# Patient Record
Sex: MALE | NOT HISPANIC OR LATINO | Employment: OTHER | URBAN - METROPOLITAN AREA
[De-identification: names, ages, dates, MRNs, and addresses within clinical notes are randomized per-mention and may not be internally consistent; named-entity substitution may affect disease eponyms.]

---

## 2017-09-05 ENCOUNTER — RECORDS - HEALTHEAST (OUTPATIENT)
Dept: LAB | Facility: CLINIC | Age: 82
End: 2017-09-05

## 2017-09-05 LAB
CHOLEST SERPL-MCNC: 139 MG/DL
FASTING STATUS PATIENT QL REPORTED: NORMAL
HDLC SERPL-MCNC: 61 MG/DL
LDLC SERPL CALC-MCNC: 65 MG/DL
TRIGL SERPL-MCNC: 63 MG/DL

## 2018-08-21 ENCOUNTER — RECORDS - HEALTHEAST (OUTPATIENT)
Dept: LAB | Facility: CLINIC | Age: 83
End: 2018-08-21

## 2018-08-21 LAB
ALBUMIN SERPL-MCNC: 3.5 G/DL (ref 3.5–5)
ANION GAP SERPL CALCULATED.3IONS-SCNC: 8 MMOL/L (ref 5–18)
BUN SERPL-MCNC: 20 MG/DL (ref 8–28)
CALCIUM SERPL-MCNC: 9.2 MG/DL (ref 8.5–10.5)
CHLORIDE BLD-SCNC: 106 MMOL/L (ref 98–107)
CO2 SERPL-SCNC: 27 MMOL/L (ref 22–31)
CREAT SERPL-MCNC: 1.25 MG/DL (ref 0.7–1.3)
GFR SERPL CREATININE-BSD FRML MDRD: 55 ML/MIN/1.73M2
GLUCOSE BLD-MCNC: 98 MG/DL (ref 70–125)
PHOSPHATE SERPL-MCNC: 3.3 MG/DL (ref 2.5–4.5)
POTASSIUM BLD-SCNC: 4.6 MMOL/L (ref 3.5–5)
SODIUM SERPL-SCNC: 141 MMOL/L (ref 136–145)

## 2018-09-06 ENCOUNTER — RECORDS - HEALTHEAST (OUTPATIENT)
Dept: LAB | Facility: CLINIC | Age: 83
End: 2018-09-06

## 2018-09-06 LAB
ALBUMIN SERPL-MCNC: 3.4 G/DL (ref 3.5–5)
ANION GAP SERPL CALCULATED.3IONS-SCNC: 8 MMOL/L (ref 5–18)
BUN SERPL-MCNC: 19 MG/DL (ref 8–28)
CALCIUM SERPL-MCNC: 8.9 MG/DL (ref 8.5–10.5)
CHLORIDE BLD-SCNC: 105 MMOL/L (ref 98–107)
CO2 SERPL-SCNC: 27 MMOL/L (ref 22–31)
CREAT SERPL-MCNC: 1.15 MG/DL (ref 0.7–1.3)
GFR SERPL CREATININE-BSD FRML MDRD: 60 ML/MIN/1.73M2
GLUCOSE BLD-MCNC: 131 MG/DL (ref 70–125)
PHOSPHATE SERPL-MCNC: 3.1 MG/DL (ref 2.5–4.5)
POTASSIUM BLD-SCNC: 4.5 MMOL/L (ref 3.5–5)
SODIUM SERPL-SCNC: 140 MMOL/L (ref 136–145)

## 2018-10-08 ENCOUNTER — RECORDS - HEALTHEAST (OUTPATIENT)
Dept: LAB | Facility: CLINIC | Age: 83
End: 2018-10-08

## 2018-10-08 LAB
ALBUMIN SERPL-MCNC: 3.4 G/DL (ref 3.5–5)
ANION GAP SERPL CALCULATED.3IONS-SCNC: 7 MMOL/L (ref 5–18)
BUN SERPL-MCNC: 22 MG/DL (ref 8–28)
CALCIUM SERPL-MCNC: 9.4 MG/DL (ref 8.5–10.5)
CHLORIDE BLD-SCNC: 100 MMOL/L (ref 98–107)
CO2 SERPL-SCNC: 31 MMOL/L (ref 22–31)
CREAT SERPL-MCNC: 1.12 MG/DL (ref 0.7–1.3)
GFR SERPL CREATININE-BSD FRML MDRD: >60 ML/MIN/1.73M2
GLUCOSE BLD-MCNC: 118 MG/DL (ref 70–125)
PHOSPHATE SERPL-MCNC: 2.8 MG/DL (ref 2.5–4.5)
POTASSIUM BLD-SCNC: 4.3 MMOL/L (ref 3.5–5)
SODIUM SERPL-SCNC: 138 MMOL/L (ref 136–145)

## 2019-02-05 ENCOUNTER — RECORDS - HEALTHEAST (OUTPATIENT)
Dept: LAB | Facility: CLINIC | Age: 84
End: 2019-02-05

## 2019-02-05 LAB
ALBUMIN SERPL-MCNC: 3.6 G/DL (ref 3.5–5)
ANION GAP SERPL CALCULATED.3IONS-SCNC: 9 MMOL/L (ref 5–18)
BUN SERPL-MCNC: 20 MG/DL (ref 8–28)
CALCIUM SERPL-MCNC: 9.2 MG/DL (ref 8.5–10.5)
CHLORIDE BLD-SCNC: 100 MMOL/L (ref 98–107)
CO2 SERPL-SCNC: 29 MMOL/L (ref 22–31)
CREAT SERPL-MCNC: 1.16 MG/DL (ref 0.7–1.3)
GFR SERPL CREATININE-BSD FRML MDRD: 60 ML/MIN/1.73M2
GLUCOSE BLD-MCNC: 82 MG/DL (ref 70–125)
PHOSPHATE SERPL-MCNC: 3 MG/DL (ref 2.5–4.5)
POTASSIUM BLD-SCNC: 4.3 MMOL/L (ref 3.5–5)
SODIUM SERPL-SCNC: 138 MMOL/L (ref 136–145)

## 2020-02-17 ENCOUNTER — RECORDS - HEALTHEAST (OUTPATIENT)
Dept: LAB | Facility: CLINIC | Age: 85
End: 2020-02-17

## 2020-02-17 LAB — ERYTHROCYTE [SEDIMENTATION RATE] IN BLOOD BY WESTERGREN METHOD: 44 MM/HR (ref 0–15)

## 2020-02-18 LAB — B BURGDOR IGG+IGM SER QL: 0.13 INDEX VALUE

## 2020-03-05 ENCOUNTER — RECORDS - HEALTHEAST (OUTPATIENT)
Dept: LAB | Facility: CLINIC | Age: 85
End: 2020-03-05

## 2020-03-05 LAB
ALBUMIN SERPL-MCNC: 3.2 G/DL (ref 3.5–5)
ALP SERPL-CCNC: 70 U/L (ref 45–120)
ALT SERPL W P-5'-P-CCNC: 13 U/L (ref 0–45)
ANION GAP SERPL CALCULATED.3IONS-SCNC: 10 MMOL/L (ref 5–18)
AST SERPL W P-5'-P-CCNC: 14 U/L (ref 0–40)
BILIRUB SERPL-MCNC: 1.5 MG/DL (ref 0–1)
BUN SERPL-MCNC: 26 MG/DL (ref 8–28)
CALCIUM SERPL-MCNC: 8.8 MG/DL (ref 8.5–10.5)
CHLORIDE BLD-SCNC: 100 MMOL/L (ref 98–107)
CHOLEST SERPL-MCNC: 163 MG/DL
CO2 SERPL-SCNC: 28 MMOL/L (ref 22–31)
CREAT SERPL-MCNC: 1.13 MG/DL (ref 0.7–1.3)
ERYTHROCYTE [SEDIMENTATION RATE] IN BLOOD BY WESTERGREN METHOD: 9 MM/HR (ref 0–15)
FASTING STATUS PATIENT QL REPORTED: NORMAL
GFR SERPL CREATININE-BSD FRML MDRD: >60 ML/MIN/1.73M2
GLUCOSE BLD-MCNC: 99 MG/DL (ref 70–125)
HDLC SERPL-MCNC: 99 MG/DL
LDLC SERPL CALC-MCNC: 52 MG/DL
POTASSIUM BLD-SCNC: 3.8 MMOL/L (ref 3.5–5)
PROT SERPL-MCNC: 6.6 G/DL (ref 6–8)
SODIUM SERPL-SCNC: 138 MMOL/L (ref 136–145)
TRIGL SERPL-MCNC: 61 MG/DL

## 2020-05-04 ENCOUNTER — RECORDS - HEALTHEAST (OUTPATIENT)
Dept: LAB | Facility: CLINIC | Age: 85
End: 2020-05-04

## 2020-05-04 LAB — ERYTHROCYTE [SEDIMENTATION RATE] IN BLOOD BY WESTERGREN METHOD: 6 MM/HR (ref 0–15)

## 2020-06-04 ENCOUNTER — RECORDS - HEALTHEAST (OUTPATIENT)
Dept: LAB | Facility: CLINIC | Age: 85
End: 2020-06-04

## 2020-06-04 LAB — ERYTHROCYTE [SEDIMENTATION RATE] IN BLOOD BY WESTERGREN METHOD: 8 MM/HR (ref 0–15)

## 2020-06-24 ENCOUNTER — RECORDS - HEALTHEAST (OUTPATIENT)
Dept: LAB | Facility: CLINIC | Age: 85
End: 2020-06-24

## 2020-06-24 LAB
APPEARANCE FLD: ABNORMAL
COLOR FLD: YELLOW
CRYSTALS SNV MICRO: ABNORMAL
LYMPHOCYTES NFR FLD MANUAL: 12 %
MACROPHAGE % - HISTORICAL: 1 %
MONOCYTE % - HISTORICAL: 19 %
NEUTS BAND NFR FLD MANUAL: 69 %
RBC FLUID - HISTORICAL: ABNORMAL /UL
WBC # FLD AUTO: ABNORMAL /UL (ref 0–99)

## 2020-06-27 LAB
BACTERIA SPEC CULT: NO GROWTH
GRAM STAIN RESULT: NORMAL
GRAM STAIN RESULT: NORMAL

## 2020-07-02 ENCOUNTER — RECORDS - HEALTHEAST (OUTPATIENT)
Dept: LAB | Facility: CLINIC | Age: 85
End: 2020-07-02

## 2020-07-02 LAB — ERYTHROCYTE [SEDIMENTATION RATE] IN BLOOD BY WESTERGREN METHOD: 5 MM/HR (ref 0–15)

## 2020-08-10 ENCOUNTER — RECORDS - HEALTHEAST (OUTPATIENT)
Dept: LAB | Facility: CLINIC | Age: 85
End: 2020-08-10

## 2020-08-10 LAB — ERYTHROCYTE [SEDIMENTATION RATE] IN BLOOD BY WESTERGREN METHOD: 28 MM/HR (ref 0–15)

## 2020-09-08 ENCOUNTER — RECORDS - HEALTHEAST (OUTPATIENT)
Dept: LAB | Facility: CLINIC | Age: 85
End: 2020-09-08

## 2020-09-08 LAB — ERYTHROCYTE [SEDIMENTATION RATE] IN BLOOD BY WESTERGREN METHOD: 10 MM/HR (ref 0–15)

## 2020-10-09 ENCOUNTER — RECORDS - HEALTHEAST (OUTPATIENT)
Dept: LAB | Facility: CLINIC | Age: 85
End: 2020-10-09

## 2020-10-09 LAB — ERYTHROCYTE [SEDIMENTATION RATE] IN BLOOD BY WESTERGREN METHOD: 14 MM/HR (ref 0–15)

## 2020-10-27 ENCOUNTER — RECORDS - HEALTHEAST (OUTPATIENT)
Dept: LAB | Facility: CLINIC | Age: 85
End: 2020-10-27

## 2020-10-27 LAB
ALBUMIN SERPL-MCNC: 3.3 G/DL (ref 3.5–5)
ALP SERPL-CCNC: 91 U/L (ref 45–120)
ALT SERPL W P-5'-P-CCNC: 10 U/L (ref 0–45)
ANION GAP SERPL CALCULATED.3IONS-SCNC: 12 MMOL/L (ref 5–18)
AST SERPL W P-5'-P-CCNC: 15 U/L (ref 0–40)
BILIRUB SERPL-MCNC: 1.6 MG/DL (ref 0–1)
BUN SERPL-MCNC: 15 MG/DL (ref 8–28)
CALCIUM SERPL-MCNC: 9.1 MG/DL (ref 8.5–10.5)
CHLORIDE BLD-SCNC: 98 MMOL/L (ref 98–107)
CO2 SERPL-SCNC: 28 MMOL/L (ref 22–31)
CREAT SERPL-MCNC: 1.14 MG/DL (ref 0.7–1.3)
ERYTHROCYTE [SEDIMENTATION RATE] IN BLOOD BY WESTERGREN METHOD: 56 MM/HR (ref 0–15)
GFR SERPL CREATININE-BSD FRML MDRD: >60 ML/MIN/1.73M2
GLUCOSE BLD-MCNC: 109 MG/DL (ref 70–125)
POTASSIUM BLD-SCNC: 3.9 MMOL/L (ref 3.5–5)
PROT SERPL-MCNC: 7.5 G/DL (ref 6–8)
SODIUM SERPL-SCNC: 138 MMOL/L (ref 136–145)

## 2020-11-23 ENCOUNTER — RECORDS - HEALTHEAST (OUTPATIENT)
Dept: LAB | Facility: CLINIC | Age: 85
End: 2020-11-23

## 2020-11-23 LAB — ERYTHROCYTE [SEDIMENTATION RATE] IN BLOOD BY WESTERGREN METHOD: 9 MM/HR (ref 0–15)

## 2020-12-21 ENCOUNTER — RECORDS - HEALTHEAST (OUTPATIENT)
Dept: LAB | Facility: CLINIC | Age: 85
End: 2020-12-21

## 2020-12-21 LAB — ERYTHROCYTE [SEDIMENTATION RATE] IN BLOOD BY WESTERGREN METHOD: 9 MM/HR (ref 0–15)

## 2021-02-01 ENCOUNTER — RECORDS - HEALTHEAST (OUTPATIENT)
Dept: LAB | Facility: CLINIC | Age: 86
End: 2021-02-01

## 2021-02-01 LAB — ERYTHROCYTE [SEDIMENTATION RATE] IN BLOOD BY WESTERGREN METHOD: 13 MM/HR (ref 0–15)

## 2021-03-15 ENCOUNTER — RECORDS - HEALTHEAST (OUTPATIENT)
Dept: LAB | Facility: CLINIC | Age: 86
End: 2021-03-15

## 2021-03-15 LAB
CHOLEST SERPL-MCNC: 133 MG/DL
ERYTHROCYTE [SEDIMENTATION RATE] IN BLOOD BY WESTERGREN METHOD: 19 MM/HR (ref 0–15)
FASTING STATUS PATIENT QL REPORTED: NORMAL
HDLC SERPL-MCNC: 73 MG/DL
LDLC SERPL CALC-MCNC: 47 MG/DL
TRIGL SERPL-MCNC: 65 MG/DL

## 2021-04-19 ENCOUNTER — RECORDS - HEALTHEAST (OUTPATIENT)
Dept: LAB | Facility: CLINIC | Age: 86
End: 2021-04-19

## 2021-04-19 LAB — ERYTHROCYTE [SEDIMENTATION RATE] IN BLOOD BY WESTERGREN METHOD: 13 MM/HR (ref 0–15)

## 2021-05-17 ENCOUNTER — RECORDS - HEALTHEAST (OUTPATIENT)
Dept: LAB | Facility: CLINIC | Age: 86
End: 2021-05-17

## 2021-05-17 LAB — ERYTHROCYTE [SEDIMENTATION RATE] IN BLOOD BY WESTERGREN METHOD: 18 MM/HR (ref 0–15)

## 2021-05-26 ENCOUNTER — RECORDS - HEALTHEAST (OUTPATIENT)
Dept: ADMINISTRATIVE | Facility: CLINIC | Age: 86
End: 2021-05-26

## 2021-06-28 ENCOUNTER — RECORDS - HEALTHEAST (OUTPATIENT)
Dept: LAB | Facility: CLINIC | Age: 86
End: 2021-06-28

## 2021-06-28 LAB — ERYTHROCYTE [SEDIMENTATION RATE] IN BLOOD BY WESTERGREN METHOD: 18 MM/HR (ref 0–15)

## 2021-08-09 ENCOUNTER — LAB REQUISITION (OUTPATIENT)
Dept: LAB | Facility: CLINIC | Age: 86
End: 2021-08-09
Payer: MEDICARE

## 2021-08-09 DIAGNOSIS — M35.3 POLYMYALGIA RHEUMATICA (H): ICD-10-CM

## 2021-08-09 LAB — ERYTHROCYTE [SEDIMENTATION RATE] IN BLOOD BY WESTERGREN METHOD: 21 MM/HR (ref 0–15)

## 2021-08-09 PROCEDURE — 85652 RBC SED RATE AUTOMATED: CPT | Mod: ORL | Performed by: FAMILY MEDICINE

## 2021-08-21 ENCOUNTER — HEALTH MAINTENANCE LETTER (OUTPATIENT)
Age: 86
End: 2021-08-21

## 2021-09-20 ENCOUNTER — LAB REQUISITION (OUTPATIENT)
Dept: LAB | Facility: CLINIC | Age: 86
End: 2021-09-20
Payer: MEDICARE

## 2021-09-20 DIAGNOSIS — M35.3 POLYMYALGIA RHEUMATICA (H): ICD-10-CM

## 2021-09-20 LAB
ALBUMIN SERPL-MCNC: 3.6 G/DL (ref 3.5–5)
ALP SERPL-CCNC: 94 U/L (ref 45–120)
ALT SERPL W P-5'-P-CCNC: 10 U/L (ref 0–45)
ANION GAP SERPL CALCULATED.3IONS-SCNC: 12 MMOL/L (ref 5–18)
AST SERPL W P-5'-P-CCNC: 18 U/L (ref 0–40)
BASOPHILS # BLD AUTO: 0.1 10E3/UL (ref 0–0.2)
BASOPHILS NFR BLD AUTO: 1 %
BILIRUB SERPL-MCNC: 1.3 MG/DL (ref 0–1)
BUN SERPL-MCNC: 22 MG/DL (ref 8–28)
CALCIUM SERPL-MCNC: 9.1 MG/DL (ref 8.5–10.5)
CHLORIDE BLD-SCNC: 101 MMOL/L (ref 98–107)
CO2 SERPL-SCNC: 29 MMOL/L (ref 22–31)
CREAT SERPL-MCNC: 1.41 MG/DL (ref 0.7–1.3)
EOSINOPHIL # BLD AUTO: 0.2 10E3/UL (ref 0–0.7)
EOSINOPHIL NFR BLD AUTO: 2 %
ERYTHROCYTE [DISTWIDTH] IN BLOOD BY AUTOMATED COUNT: 13.3 % (ref 10–15)
ERYTHROCYTE [SEDIMENTATION RATE] IN BLOOD BY WESTERGREN METHOD: 19 MM/HR (ref 0–15)
GFR SERPL CREATININE-BSD FRML MDRD: 44 ML/MIN/1.73M2
GLUCOSE BLD-MCNC: 83 MG/DL (ref 70–125)
HCT VFR BLD AUTO: 45.3 % (ref 40–53)
HGB BLD-MCNC: 15.1 G/DL (ref 13.3–17.7)
IMM GRANULOCYTES # BLD: 0 10E3/UL
IMM GRANULOCYTES NFR BLD: 0 %
LYMPHOCYTES # BLD AUTO: 3.2 10E3/UL (ref 0.8–5.3)
LYMPHOCYTES NFR BLD AUTO: 36 %
MCH RBC QN AUTO: 31.4 PG (ref 26.5–33)
MCHC RBC AUTO-ENTMCNC: 33.3 G/DL (ref 31.5–36.5)
MCV RBC AUTO: 94 FL (ref 78–100)
MONOCYTES # BLD AUTO: 1.3 10E3/UL (ref 0–1.3)
MONOCYTES NFR BLD AUTO: 15 %
NEUTROPHILS # BLD AUTO: 4.2 10E3/UL (ref 1.6–8.3)
NEUTROPHILS NFR BLD AUTO: 46 %
NRBC # BLD AUTO: 0 10E3/UL
NRBC BLD AUTO-RTO: 0 /100
PLATELET # BLD AUTO: 197 10E3/UL (ref 150–450)
POTASSIUM BLD-SCNC: 4 MMOL/L (ref 3.5–5)
PROT SERPL-MCNC: 7.5 G/DL (ref 6–8)
RBC # BLD AUTO: 4.81 10E6/UL (ref 4.4–5.9)
SODIUM SERPL-SCNC: 142 MMOL/L (ref 136–145)
WBC # BLD AUTO: 9 10E3/UL (ref 4–11)

## 2021-09-20 PROCEDURE — 85652 RBC SED RATE AUTOMATED: CPT | Mod: ORL | Performed by: FAMILY MEDICINE

## 2021-09-20 PROCEDURE — 80053 COMPREHEN METABOLIC PANEL: CPT | Mod: ORL | Performed by: FAMILY MEDICINE

## 2021-09-20 PROCEDURE — 36415 COLL VENOUS BLD VENIPUNCTURE: CPT | Mod: ORL | Performed by: FAMILY MEDICINE

## 2021-09-20 PROCEDURE — 85025 COMPLETE CBC W/AUTO DIFF WBC: CPT | Mod: ORL | Performed by: FAMILY MEDICINE

## 2021-10-16 ENCOUNTER — HEALTH MAINTENANCE LETTER (OUTPATIENT)
Age: 86
End: 2021-10-16

## 2021-11-22 ENCOUNTER — LAB REQUISITION (OUTPATIENT)
Dept: LAB | Facility: CLINIC | Age: 86
End: 2021-11-22
Payer: MEDICARE

## 2021-11-22 DIAGNOSIS — M35.3 POLYMYALGIA RHEUMATICA (H): ICD-10-CM

## 2021-11-22 LAB — ERYTHROCYTE [SEDIMENTATION RATE] IN BLOOD BY WESTERGREN METHOD: 24 MM/HR (ref 0–15)

## 2021-11-22 PROCEDURE — 85652 RBC SED RATE AUTOMATED: CPT | Mod: ORL | Performed by: FAMILY MEDICINE

## 2021-12-20 ENCOUNTER — LAB REQUISITION (OUTPATIENT)
Dept: LAB | Facility: CLINIC | Age: 86
End: 2021-12-20
Payer: MEDICARE

## 2021-12-20 DIAGNOSIS — M35.3 POLYMYALGIA RHEUMATICA (H): ICD-10-CM

## 2021-12-20 LAB — ERYTHROCYTE [SEDIMENTATION RATE] IN BLOOD BY WESTERGREN METHOD: 15 MM/HR (ref 0–15)

## 2021-12-20 PROCEDURE — 85652 RBC SED RATE AUTOMATED: CPT | Mod: ORL | Performed by: FAMILY MEDICINE

## 2022-02-01 ENCOUNTER — LAB REQUISITION (OUTPATIENT)
Dept: LAB | Facility: CLINIC | Age: 87
End: 2022-02-01
Payer: MEDICARE

## 2022-02-01 DIAGNOSIS — M35.3 POLYMYALGIA RHEUMATICA (H): ICD-10-CM

## 2022-02-01 LAB — ERYTHROCYTE [SEDIMENTATION RATE] IN BLOOD BY WESTERGREN METHOD: 39 MM/HR (ref 0–15)

## 2022-02-01 PROCEDURE — 85652 RBC SED RATE AUTOMATED: CPT | Mod: ORL | Performed by: FAMILY MEDICINE

## 2022-03-02 ENCOUNTER — LAB REQUISITION (OUTPATIENT)
Dept: LAB | Facility: CLINIC | Age: 87
End: 2022-03-02
Payer: MEDICARE

## 2022-03-02 DIAGNOSIS — M35.3 POLYMYALGIA RHEUMATICA (H): ICD-10-CM

## 2022-03-02 LAB — ERYTHROCYTE [SEDIMENTATION RATE] IN BLOOD BY WESTERGREN METHOD: 20 MM/HR (ref 0–15)

## 2022-03-02 PROCEDURE — 85652 RBC SED RATE AUTOMATED: CPT | Mod: ORL | Performed by: FAMILY MEDICINE

## 2022-04-04 ENCOUNTER — LAB REQUISITION (OUTPATIENT)
Dept: LAB | Facility: CLINIC | Age: 87
End: 2022-04-04
Payer: MEDICARE

## 2022-04-04 ENCOUNTER — TRANSFERRED RECORDS (OUTPATIENT)
Dept: HEALTH INFORMATION MANAGEMENT | Facility: CLINIC | Age: 87
End: 2022-04-04

## 2022-04-04 DIAGNOSIS — M35.3 POLYMYALGIA RHEUMATICA (H): ICD-10-CM

## 2022-04-04 LAB — ERYTHROCYTE [SEDIMENTATION RATE] IN BLOOD BY WESTERGREN METHOD: 18 MM/HR (ref 0–15)

## 2022-04-04 PROCEDURE — 85652 RBC SED RATE AUTOMATED: CPT | Mod: ORL | Performed by: FAMILY MEDICINE

## 2022-06-06 ENCOUNTER — LAB REQUISITION (OUTPATIENT)
Dept: LAB | Facility: CLINIC | Age: 87
End: 2022-06-06
Payer: MEDICARE

## 2022-06-06 DIAGNOSIS — M35.3 POLYMYALGIA RHEUMATICA (H): ICD-10-CM

## 2022-06-06 LAB — ERYTHROCYTE [SEDIMENTATION RATE] IN BLOOD BY WESTERGREN METHOD: 83 MM/HR (ref 0–15)

## 2022-06-06 PROCEDURE — 85652 RBC SED RATE AUTOMATED: CPT | Performed by: FAMILY MEDICINE

## 2022-06-06 PROCEDURE — 85652 RBC SED RATE AUTOMATED: CPT | Mod: ORL | Performed by: FAMILY MEDICINE

## 2022-07-25 ENCOUNTER — LAB REQUISITION (OUTPATIENT)
Dept: LAB | Facility: CLINIC | Age: 87
End: 2022-07-25
Payer: MEDICARE

## 2022-07-25 DIAGNOSIS — I10 ESSENTIAL (PRIMARY) HYPERTENSION: ICD-10-CM

## 2022-07-25 DIAGNOSIS — M35.3 POLYMYALGIA RHEUMATICA (H): ICD-10-CM

## 2022-07-25 LAB
ALBUMIN SERPL BCG-MCNC: 3.6 G/DL (ref 3.5–5.2)
ANION GAP SERPL CALCULATED.3IONS-SCNC: 13 MMOL/L (ref 7–15)
BUN SERPL-MCNC: 20.1 MG/DL (ref 8–23)
CALCIUM SERPL-MCNC: 8.7 MG/DL (ref 8.2–9.6)
CHLORIDE SERPL-SCNC: 97 MMOL/L (ref 98–107)
CREAT SERPL-MCNC: 1.18 MG/DL (ref 0.67–1.17)
DEPRECATED HCO3 PLAS-SCNC: 27 MMOL/L (ref 22–29)
ERYTHROCYTE [SEDIMENTATION RATE] IN BLOOD BY WESTERGREN METHOD: 18 MM/HR (ref 0–20)
GFR SERPL CREATININE-BSD FRML MDRD: 59 ML/MIN/1.73M2
GLUCOSE SERPL-MCNC: 145 MG/DL (ref 70–99)
PHOSPHATE SERPL-MCNC: 2.6 MG/DL (ref 2.5–4.5)
POTASSIUM SERPL-SCNC: 3.9 MMOL/L (ref 3.4–5.3)
SODIUM SERPL-SCNC: 137 MMOL/L (ref 136–145)

## 2022-07-25 PROCEDURE — 85652 RBC SED RATE AUTOMATED: CPT | Mod: ORL | Performed by: FAMILY MEDICINE

## 2022-07-25 PROCEDURE — 80051 ELECTROLYTE PANEL: CPT | Mod: ORL | Performed by: FAMILY MEDICINE

## 2022-08-04 ENCOUNTER — HOSPITAL ENCOUNTER (INPATIENT)
Facility: HOSPITAL | Age: 87
LOS: 1 days | Discharge: HOME | DRG: 069 | End: 2022-08-08
Attending: EMERGENCY MEDICINE | Admitting: INTERNAL MEDICINE
Payer: MEDICARE

## 2022-08-04 ENCOUNTER — APPOINTMENT (OUTPATIENT)
Dept: CT IMAGING | Facility: HOSPITAL | Age: 87
DRG: 069 | End: 2022-08-04
Attending: EMERGENCY MEDICINE
Payer: MEDICARE

## 2022-08-04 DIAGNOSIS — G45.9 TIA (TRANSIENT ISCHEMIC ATTACK): ICD-10-CM

## 2022-08-04 DIAGNOSIS — U07.1 INFECTION DUE TO 2019 NOVEL CORONAVIRUS: ICD-10-CM

## 2022-08-04 DIAGNOSIS — I50.31 ACUTE DIASTOLIC CONGESTIVE HEART FAILURE (H): Primary | ICD-10-CM

## 2022-08-04 LAB
ANION GAP SERPL CALCULATED.3IONS-SCNC: 8 MMOL/L (ref 5–18)
APTT PPP: 29 SECONDS (ref 22–38)
BASOPHILS # BLD MANUAL: 0.2 10E3/UL (ref 0–0.2)
BASOPHILS NFR BLD MANUAL: 2 %
BUN SERPL-MCNC: 20 MG/DL (ref 8–28)
CALCIUM SERPL-MCNC: 9.2 MG/DL (ref 8.5–10.5)
CHLORIDE BLD-SCNC: 95 MMOL/L (ref 98–107)
CHOLEST SERPL-MCNC: 150 MG/DL
CO2 SERPL-SCNC: 30 MMOL/L (ref 22–31)
CREAT SERPL-MCNC: 1.33 MG/DL (ref 0.7–1.3)
EOSINOPHIL # BLD MANUAL: 0 10E3/UL (ref 0–0.7)
EOSINOPHIL NFR BLD MANUAL: 0 %
ERYTHROCYTE [DISTWIDTH] IN BLOOD BY AUTOMATED COUNT: 13.7 % (ref 10–15)
GFR SERPL CREATININE-BSD FRML MDRD: 51 ML/MIN/1.73M2
GLUCOSE BLD-MCNC: 92 MG/DL (ref 70–125)
GLUCOSE BLDC GLUCOMTR-MCNC: 96 MG/DL (ref 70–99)
HBA1C MFR BLD: 5.6 %
HCT VFR BLD AUTO: 46.7 % (ref 40–53)
HDLC SERPL-MCNC: 62 MG/DL
HGB BLD-MCNC: 15.7 G/DL (ref 13.3–17.7)
HOLD SPECIMEN: NORMAL
INR PPP: 0.95 (ref 0.85–1.15)
LDLC SERPL CALC-MCNC: 69 MG/DL
LYMPHOCYTES # BLD MANUAL: 3.3 10E3/UL (ref 0.8–5.3)
LYMPHOCYTES NFR BLD MANUAL: 44 %
MCH RBC QN AUTO: 30.7 PG (ref 26.5–33)
MCHC RBC AUTO-ENTMCNC: 33.6 G/DL (ref 31.5–36.5)
MCV RBC AUTO: 91 FL (ref 78–100)
MONOCYTES # BLD MANUAL: 0.8 10E3/UL (ref 0–1.3)
MONOCYTES NFR BLD MANUAL: 10 %
NEUTROPHILS # BLD MANUAL: 3.3 10E3/UL (ref 1.6–8.3)
NEUTROPHILS NFR BLD MANUAL: 44 %
PLAT MORPH BLD: ABNORMAL
PLATELET # BLD AUTO: 228 10E3/UL (ref 150–450)
POTASSIUM BLD-SCNC: 3.6 MMOL/L (ref 3.5–5)
RADIOLOGIST FLAGS: ABNORMAL
RBC # BLD AUTO: 5.11 10E6/UL (ref 4.4–5.9)
RBC MORPH BLD: ABNORMAL
SARS-COV-2 RNA RESP QL NAA+PROBE: POSITIVE
SODIUM SERPL-SCNC: 133 MMOL/L (ref 136–145)
TRIGL SERPL-MCNC: 96 MG/DL
TROPONIN I SERPL-MCNC: 0.01 NG/ML (ref 0–0.29)
VARIANT LYMPHS BLD QL SMEAR: PRESENT
WBC # BLD AUTO: 7.6 10E3/UL (ref 4–11)

## 2022-08-04 PROCEDURE — G0378 HOSPITAL OBSERVATION PER HR: HCPCS

## 2022-08-04 PROCEDURE — 85610 PROTHROMBIN TIME: CPT | Performed by: EMERGENCY MEDICINE

## 2022-08-04 PROCEDURE — 70498 CT ANGIOGRAPHY NECK: CPT | Mod: MG

## 2022-08-04 PROCEDURE — 99285 EMERGENCY DEPT VISIT HI MDM: CPT | Mod: 25

## 2022-08-04 PROCEDURE — 85041 AUTOMATED RBC COUNT: CPT | Performed by: EMERGENCY MEDICINE

## 2022-08-04 PROCEDURE — 84484 ASSAY OF TROPONIN QUANT: CPT | Performed by: EMERGENCY MEDICINE

## 2022-08-04 PROCEDURE — 85007 BL SMEAR W/DIFF WBC COUNT: CPT | Performed by: EMERGENCY MEDICINE

## 2022-08-04 PROCEDURE — 85730 THROMBOPLASTIN TIME PARTIAL: CPT | Performed by: EMERGENCY MEDICINE

## 2022-08-04 PROCEDURE — 93005 ELECTROCARDIOGRAM TRACING: CPT | Performed by: EMERGENCY MEDICINE

## 2022-08-04 PROCEDURE — G1010 CDSM STANSON: HCPCS

## 2022-08-04 PROCEDURE — 83036 HEMOGLOBIN GLYCOSYLATED A1C: CPT | Performed by: INTERNAL MEDICINE

## 2022-08-04 PROCEDURE — 87635 SARS-COV-2 COVID-19 AMP PRB: CPT | Performed by: EMERGENCY MEDICINE

## 2022-08-04 PROCEDURE — 250N000011 HC RX IP 250 OP 636: Performed by: EMERGENCY MEDICINE

## 2022-08-04 PROCEDURE — 80061 LIPID PANEL: CPT | Performed by: INTERNAL MEDICINE

## 2022-08-04 PROCEDURE — 36415 COLL VENOUS BLD VENIPUNCTURE: CPT | Performed by: EMERGENCY MEDICINE

## 2022-08-04 PROCEDURE — 250N000013 HC RX MED GY IP 250 OP 250 PS 637: Performed by: EMERGENCY MEDICINE

## 2022-08-04 PROCEDURE — 99218 PR INITIAL OBSERVATION CARE,LEVEL I: CPT | Performed by: INTERNAL MEDICINE

## 2022-08-04 PROCEDURE — 80048 BASIC METABOLIC PNL TOTAL CA: CPT | Performed by: EMERGENCY MEDICINE

## 2022-08-04 PROCEDURE — 83735 ASSAY OF MAGNESIUM: CPT | Performed by: FAMILY MEDICINE

## 2022-08-04 PROCEDURE — 85018 HEMOGLOBIN: CPT | Performed by: EMERGENCY MEDICINE

## 2022-08-04 PROCEDURE — 84145 PROCALCITONIN (PCT): CPT | Performed by: FAMILY MEDICINE

## 2022-08-04 PROCEDURE — C9803 HOPD COVID-19 SPEC COLLECT: HCPCS

## 2022-08-04 RX ORDER — AMOXICILLIN 250 MG
2 CAPSULE ORAL 2 TIMES DAILY PRN
Status: DISCONTINUED | OUTPATIENT
Start: 2022-08-04 | End: 2022-08-08 | Stop reason: HOSPADM

## 2022-08-04 RX ORDER — HYDRALAZINE HYDROCHLORIDE 20 MG/ML
10 INJECTION INTRAMUSCULAR; INTRAVENOUS EVERY 6 HOURS PRN
Status: DISCONTINUED | OUTPATIENT
Start: 2022-08-04 | End: 2022-08-08 | Stop reason: HOSPADM

## 2022-08-04 RX ORDER — ANTIOX #8/OM3/DHA/EPA/LUT/ZEAX 250-2.5 MG
1 CAPSULE ORAL 2 TIMES DAILY
COMMUNITY

## 2022-08-04 RX ORDER — LOPERAMIDE HCL 2 MG
4 CAPSULE ORAL DAILY PRN
Status: DISCONTINUED | OUTPATIENT
Start: 2022-08-04 | End: 2022-08-08 | Stop reason: HOSPADM

## 2022-08-04 RX ORDER — ACETAMINOPHEN 325 MG/1
650 TABLET ORAL EVERY 6 HOURS PRN
Status: DISCONTINUED | OUTPATIENT
Start: 2022-08-04 | End: 2022-08-08 | Stop reason: HOSPADM

## 2022-08-04 RX ORDER — PREDNISONE 1 MG/1
4 TABLET ORAL DAILY
Status: DISCONTINUED | OUTPATIENT
Start: 2022-08-05 | End: 2022-08-08 | Stop reason: HOSPADM

## 2022-08-04 RX ORDER — LIDOCAINE 40 MG/G
CREAM TOPICAL
Status: DISCONTINUED | OUTPATIENT
Start: 2022-08-04 | End: 2022-08-05

## 2022-08-04 RX ORDER — SIMETHICONE 80 MG
80-160 TABLET,CHEWABLE ORAL 2 TIMES DAILY PRN
Status: DISCONTINUED | OUTPATIENT
Start: 2022-08-04 | End: 2022-08-08 | Stop reason: HOSPADM

## 2022-08-04 RX ORDER — PREDNISONE 1 MG/1
60 TABLET ORAL DAILY
COMMUNITY

## 2022-08-04 RX ORDER — ONDANSETRON 2 MG/ML
4 INJECTION INTRAMUSCULAR; INTRAVENOUS EVERY 6 HOURS PRN
Status: DISCONTINUED | OUTPATIENT
Start: 2022-08-04 | End: 2022-08-08 | Stop reason: HOSPADM

## 2022-08-04 RX ORDER — ATENOLOL 25 MG/1
50 TABLET ORAL DAILY
Status: DISCONTINUED | OUTPATIENT
Start: 2022-08-05 | End: 2022-08-08 | Stop reason: HOSPADM

## 2022-08-04 RX ORDER — SIMETHICONE 125 MG
1 CAPSULE ORAL 2 TIMES DAILY PRN
COMMUNITY

## 2022-08-04 RX ORDER — SIMVASTATIN 20 MG
20 TABLET ORAL AT BEDTIME
COMMUNITY

## 2022-08-04 RX ORDER — AMLODIPINE BESYLATE 5 MG/1
5 TABLET ORAL DAILY
Status: DISCONTINUED | OUTPATIENT
Start: 2022-08-05 | End: 2022-08-08 | Stop reason: HOSPADM

## 2022-08-04 RX ORDER — HYDROCHLOROTHIAZIDE 25 MG/1
25 TABLET ORAL DAILY
Status: ON HOLD | COMMUNITY
End: 2022-08-08

## 2022-08-04 RX ORDER — HYDROCHLOROTHIAZIDE 25 MG/1
25 TABLET ORAL DAILY
Status: DISCONTINUED | OUTPATIENT
Start: 2022-08-05 | End: 2022-08-06

## 2022-08-04 RX ORDER — ONDANSETRON 4 MG/1
4 TABLET, ORALLY DISINTEGRATING ORAL EVERY 6 HOURS PRN
Status: DISCONTINUED | OUTPATIENT
Start: 2022-08-04 | End: 2022-08-08 | Stop reason: HOSPADM

## 2022-08-04 RX ORDER — ENOXAPARIN SODIUM 100 MG/ML
40 INJECTION SUBCUTANEOUS EVERY 24 HOURS
Status: DISCONTINUED | OUTPATIENT
Start: 2022-08-04 | End: 2022-08-04

## 2022-08-04 RX ORDER — CLOPIDOGREL BISULFATE 75 MG/1
300 TABLET ORAL ONCE
Status: COMPLETED | OUTPATIENT
Start: 2022-08-04 | End: 2022-08-04

## 2022-08-04 RX ORDER — LIDOCAINE 40 MG/G
CREAM TOPICAL
Status: DISCONTINUED | OUTPATIENT
Start: 2022-08-04 | End: 2022-08-08 | Stop reason: HOSPADM

## 2022-08-04 RX ORDER — ACETAMINOPHEN 650 MG/1
650 SUPPOSITORY RECTAL EVERY 6 HOURS PRN
Status: DISCONTINUED | OUTPATIENT
Start: 2022-08-04 | End: 2022-08-08 | Stop reason: HOSPADM

## 2022-08-04 RX ORDER — ASPIRIN 325 MG
325 TABLET ORAL ONCE
Status: COMPLETED | OUTPATIENT
Start: 2022-08-04 | End: 2022-08-04

## 2022-08-04 RX ORDER — VIT C/E/ZN/COPPR/LUTEIN/ZEAXAN 60 MG-6 MG
1 CAPSULE ORAL 2 TIMES DAILY
Status: DISCONTINUED | OUTPATIENT
Start: 2022-08-04 | End: 2022-08-08 | Stop reason: HOSPADM

## 2022-08-04 RX ORDER — IOPAMIDOL 755 MG/ML
75 INJECTION, SOLUTION INTRAVASCULAR ONCE
Status: COMPLETED | OUTPATIENT
Start: 2022-08-04 | End: 2022-08-04

## 2022-08-04 RX ORDER — AMOXICILLIN 250 MG
1 CAPSULE ORAL 2 TIMES DAILY PRN
Status: DISCONTINUED | OUTPATIENT
Start: 2022-08-04 | End: 2022-08-08 | Stop reason: HOSPADM

## 2022-08-04 RX ORDER — SIMVASTATIN 10 MG
20 TABLET ORAL AT BEDTIME
Status: DISCONTINUED | OUTPATIENT
Start: 2022-08-04 | End: 2022-08-08 | Stop reason: HOSPADM

## 2022-08-04 RX ORDER — AMLODIPINE BESYLATE 5 MG/1
5 TABLET ORAL DAILY
COMMUNITY

## 2022-08-04 RX ADMIN — IOPAMIDOL 75 ML: 755 INJECTION, SOLUTION INTRAVENOUS at 17:39

## 2022-08-04 RX ADMIN — CLOPIDOGREL BISULFATE 300 MG: 75 TABLET ORAL at 18:16

## 2022-08-04 RX ADMIN — ASPIRIN 325 MG: 325 TABLET ORAL at 18:16

## 2022-08-04 ASSESSMENT — ENCOUNTER SYMPTOMS
COLOR CHANGE: 0
WEAKNESS: 0
ABDOMINAL PAIN: 0
NECK STIFFNESS: 0
SHORTNESS OF BREATH: 0
DIFFICULTY URINATING: 0
CONFUSION: 0
HEADACHES: 0
FEVER: 0
SPEECH DIFFICULTY: 1
EYE REDNESS: 0
ARTHRALGIAS: 0

## 2022-08-04 NOTE — CONSULTS
[facility]    Stroke Telephone Note    I was called by Purvi Jordan on 08/04/22 regarding patient Jeffy Olson. The patient is a 89 year old male with prior TIA who presents with acute onset of confusion, he ws trying to start the car and couldn't remember how to start it, he went back inside and had difficulty with expressive aphasia and couldn't recognize his granddaughter. His symptoms have resolved and has an NIHSS 0.     Stroke Code Data (for stroke code without tele)  Stroke code activated 08/04/22   1720   Stroke provider first response  08/04/22   1720            Last known normal 08/04/22   1545        Time of discovery   (or onset of symptoms) 08/04/22   1545   Head CT read by Stroke Neuro Dr/Provider 08/04/22   1728   Was stroke code de-escalated? 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     Intravenous Thrombolysis  Not given due to:   - minor/isolated/quickly resolving symptoms    Endovascular Treatment  Not initiated due to absence of proximal vessel occlusion    Impression  Transient ischemic attack- ABCD2 score 5, evidence of carotid bifurcation atherosclerosis without any significant stenosis.       Recommendations   - Neurochecks and Vital Signs every Q4H   - Daily aspirin 81 mg for secondary stroke prevention  - Plavix (clopidogrel) 300 mg PO loading dose x 1  - Plavix (clopidogrel) 75 mg PO Daily  - Statin: Lipitor 40mg  - MRI Brain with and without contrast  - TTE (with Bubble Study if age 60 yrs or less)  - 24-hour Telemetry  - Bedside Glucose Monitoring  - A1c, Lipid Panel  - PT/OT/SLP  - Stroke Education  - Euthermia, Euglycemia    My recommendations are based on the information provided over the phone by Jeffy Olson's in-person providers. They are not intended to replace the  "clinical judgment of his in-person providers. I was not requested to personally see or examine the patient at this time.    The Stroke Staff is Dr. Diaz.    Zandra Lozano MD  Vascular Neurology Fellow  To page me or covering stroke neurology team member, click here: [email or web address]   Choose \"On Call\" tab at top, then search dropdown box for \"Neurology Adult\", select location, press Enter, then look for stroke/neuro ICU/telestroke.        "

## 2022-08-04 NOTE — ED TRIAGE NOTES
Pt arrives with confusion that started 1.5 hours ago. Pt is just getting over COVID and has been boosted twice. Pt is alert. Pt states he was outside getting ready to start a car but forgot how to. He also was unable to identify his granddaughter      Triage Assessment     Row Name 08/04/22 6509       Triage Assessment (Adult)    Airway WDL WDL       Respiratory WDL    Respiratory WDL WDL       Skin Circulation/Temperature WDL    Skin Circulation/Temperature WDL WDL       Cardiac WDL    Cardiac WDL WDL       Peripheral/Neurovascular WDL    Peripheral Neurovascular WDL WDL       Cognitive/Neuro/Behavioral WDL    Cognitive/Neuro/Behavioral WDL X;orientation    Level of Consciousness confused    Orientation disoriented to;person;situation;time       Pupils (CN II)    Pupil PERRLA yes    Pupil Size Left 4 mm    Pupil Size Right 4 mm       Success Coma Scale    Best Eye Response 4-->(E4) spontaneous    Best Motor Response 6-->(M6) obeys commands    Best Verbal Response 4-->(V4) confused    Dominic Coma Scale Score 14

## 2022-08-04 NOTE — ED NOTES
"ED Provider In Triage Note  [facility]  Encounter Date: Aug 4, 2022    Chief Complaint   Patient presents with     Altered Mental Status     Tier One Stroke Code       Brief HPI:   Jeffy Olson is a 89 year old male presenting to the Emergency Department with a chief complaint of new onset speech difficulty/confusion that started around 1545.  Came inside from trying to start car, confused on words for car or how to start it and couldn't recall a close family member's name. This seems to have resolved.      Brief Physical Exam:  BP (!) 187/86   Pulse 70   Temp 99.6  F (37.6  C) (Temporal)   Resp 16   Ht 1.778 m (5' 10\")   Wt 74.8 kg (165 lb)   SpO2 90%   BMI 23.68 kg/m    General: Non-toxic appearing  HEENT: Atraumatic  Resp: No respiratory distress  Abdomen: Non-peritoneal  Neuro: Intact,  Alert, oriented, answers questions appropriately  Psych: Behavior appropriate      Plan Initiated in Triage:  Orders Placed This Encounter     CTA Head Neck with Contrast     Basic metabolic panel     INR     Partial thromboplastin time     Troponin I           PIT Dispo:   Tier 1 Stroke Code Eval    Omer Arguelles MD on 8/4/2022 at 5:11 PM    Patient was evaluated by the Physician in Triage due to a limitation of available rooms in the Emergency Department. A plan of care was discussed based on the information obtained on the initial evaluation and patient was consuled to return back to the Emergency Department lobby after this initial evalutaiton until results were obtained or a room became available in the Emergency Department. Patient was counseled not to leave prior to receiving the results of their workup.     Omer Arguelles MD  [facility] EMERGENCY DEPARTMENT  [address] [place] [id number]  [phone]       Omer Arguelles MD  08/04/22 1719    "

## 2022-08-04 NOTE — ED PROVIDER NOTES
EMERGENCY DEPARTMENT ENCOUNTER     NAME: Jeffy Olson   AGE: 89 year old male   YOB: 1931   MRN: [id number]   EVALUATION DATE & TIME: No admission date for patient encounter.   PCP: Zaki Ochoa     Chief Complaint   Patient presents with     Altered Mental Status     Tier One Stroke Code   :    FINAL IMPRESSION       1. TIA (transient ischemic attack)           ED COURSE & MEDICAL DECISION MAKING      Pertinent Labs & Imaging studies reviewed. (See chart for details)   89 year old male  presents to the Emergency Department for evaluation of an episode of acute aphasia and memory loss.  Patient went to get in his car and started, could not remember how to start his car or could not remember who his granddaughter was.  By the time of arrival here symptoms have resolved.  He does have a history of previous TIA. Initial Vitals Reviewed. Initial exam notable for well-appearing elderly male who currently has an NIH stroke scale of 0, no speech difficulty and no neurologic deficit.  I did consider a small stroke versus TIA, and a stroke code was called as he was within the window for intervention.  The CT and CTA are negative.  Neurology from the  with the stroke team evaluated the patient and they are recommending a full Plavix and aspirin load, admission for TIA work-up.  I discussed the case with hospitalist Dr. Jauregui who accepted the patient for admission.        5:15 PM I introduced myself to the patient, obtained patient history, performed a physical exam, and discussed plan for ED workup including potential diagnostic laboratory/imaging studies and interventions. Tier 1 stroke code called.  5:20 PM Spoke with the stroke neurologist, Dr. Lozano.  5:36 PM Spoke with the radiologist. Plain CT normal, awaiting CTA result.  5:48 PM Spoke with the stroke neurologist, Dr. Lozano, stroke code cancelled.  I spoke with the hospitalist, Dr. Jauregui. We discussed the patient's case, and  they agree to admit the patient.      At the conclusion of the encounter I discussed the results of all of the tests and the disposition. The questions were answered. The patient or family acknowledged understanding and was agreeable with the care plan.     35 minutes critical care time, see procedure note below for details if relevant    MEDICATIONS GIVEN IN THE EMERGENCY:   Medications   clopidogrel (PLAVIX) tablet 300 mg (has no administration in time range)   aspirin (ASA) tablet 325 mg (has no administration in time range)   iopamidol (ISOVUE-370) solution 75 mL (75 mLs Intravenous Given 8/4/22 8892)      NEW PRESCRIPTIONS STARTED AT TODAY'S ER VISIT   New Prescriptions    No medications on file     ================================================================   HISTORY OF PRESENT ILLNESS       Patient information was obtained from: Patient   Use of Intrepreter: N/A    Jeffy Olson is a 89 year old male with history of HTN, HLD, and TIA who presents to the ED for evaluation of aphasia. Apr 1.5 hpours prior to arrival the patient had an episode of aphasia in which he was unable to explain how to start a car, what he was doing with the car, and was unable to recognize a close family friend. He has a history of TIA. At the time of ED presentation all symptoms have resolved. He has not had any focal weakness.    The patient was diagnosed with COVID 2 weeks ago. He recently had some issues feeling short of breath and was evaluated at urgent care.    Per chart review, the patient was seen at [facility] on 8/3/22 presenting for evaluation of shortness of breath. The patient reported testing positive for COVID 2 weeks ago and has since had increased shortness of breath with exercise. On exam, patient had good breath sounds bilaterally. CBC unremarkable, BMP showed mild creatinine elevation which is at baseline, D-dimer and troponin negative. XR Chest showed normal heart size and pulmonary  vascularity, no new infiltrates or effusions. ECG showed sinus rhythm with 1st degree AV block and PVCs, RBBB. The patient was sent home.    ================================================================    REVIEW OF SYSTEMS       Review of Systems   Constitutional: Negative for fever.   HENT: Negative for congestion.    Eyes: Negative for redness.   Respiratory: Negative for shortness of breath.    Cardiovascular: Negative for chest pain.   Gastrointestinal: Negative for abdominal pain.   Genitourinary: Negative for difficulty urinating.   Musculoskeletal: Negative for arthralgias and neck stiffness.   Skin: Negative for color change.   Neurological: Positive for speech difficulty. Negative for weakness and headaches.   Psychiatric/Behavioral: Negative for confusion.   All other systems reviewed and are negative.      PAST HISTORY     PAST MEDICAL HISTORY:   No past medical history on file.   TIA  PAST SURGICAL HISTORY:   Past Surgical History:   Procedure Laterality Date     CATARACT EXTRACTION Bilateral      CERVICAL FUSION       HAND SURGERY Bilateral      HEMORRHOIDECTOMY EXTERNAL       HERNIA REPAIR      x2     RELEASE CARPAL TUNNEL Right      REPLACEMENT TOTAL KNEE Bilateral       CURRENT MEDICATIONS:   atenolol (TENORMIN) 25 MG tablet  cyanocobalamin 1,000 mcg/mL injection  etanercept (ENBREL) 50 mg/mL (0.98 mL) injection  loperamide (IMODIUM A-D) 2 mg tablet  loratadine (CLARITIN) 10 mg tablet  methylcellulose (CITRUCEL) 500 mg Tab      ALLERGIES:   No Known Allergies   FAMILY HISTORY:   No family history on file.   SOCIAL HISTORY:   Social History     Socioeconomic History     Marital status:    Tobacco Use     Smoking status: Former Smoker   Substance and Sexual Activity     Alcohol use: No     Comment: Alcoholic Drinks/day: Hasn't drank in a 1.5 weeks, since last admission     Drug use: No   Social History Narrative    12/23/2015 - Retired, . Has 1 adult daughter.        VITALS  Patient  "Vitals for the past 24 hrs:   BP Temp Temp src Pulse Resp SpO2 Height Weight   08/04/22 1713 (!) 187/86 99.6  F (37.6  C) Temporal 70 16 90 % 1.778 m (5' 10\") 74.8 kg (165 lb)        ================================================================    PHYSICAL EXAM     VITAL SIGNS: BP (!) 187/86   Pulse 70   Temp 99.6  F (37.6  C) (Temporal)   Resp 16   Ht 1.778 m (5' 10\")   Wt 74.8 kg (165 lb)   SpO2 90%   BMI 23.68 kg/m     Constitutional:  Awake, no acute distress   HENT:  Atraumatic, oropharynx without exudate or erythema, membranes moist  Lymph:  No adenopathy  Eyes: EOM intact, PERRL, no injection  Neck: Supple  Respiratory:  Clear to auscultation bilaterally, no wheezes or crackles   Cardiovascular:  Regular rate and rhythm, single S1 and S2   GI:  Soft, nontender, nondistended, no rebound or guarding   Musculoskeletal:  Moves all extremities, no lower extremity edema, no deformities    Skin:  Warm, dry  Neurologic:  Alert and oriented x3, no focal deficits noted     National Institutes of Health Stroke Scale  Exam Interval: Baseline   Score    Level of consciousness: (0)   Alert, keenly responsive    LOC questions: (0)   Answers both questions correctly    LOC commands: (0)   Performs both tasks correctly    Best gaze: (0)   Normal    Visual: (0)   No visual loss    Facial palsy: (0)   Normal symmetrical movements    Motor arm (left): (0)   No drift    Motor arm (right): (0)   No drift    Motor leg (left): (0)   No drift    Motor leg (right): (0)   No drift    Limb ataxia: (0)   Absent    Sensory: (0)   Normal- no sensory loss    Best language: (0)   Normal- no aphasia    Dysarthria: (0)   Normal    Extinction and inattention: (0)   No abnormality        Total Score:  0       ================================================================  LAB       All pertinent labs reviewed and interpreted.   Labs Ordered and Resulted from Time of ED Arrival to Time of ED Departure   GLUCOSE BY METER - Normal       " Result Value    GLUCOSE BY METER POCT 96     GLUCOSE MONITOR NURSING POCT   BASIC METABOLIC PANEL   INR   PARTIAL THROMBOPLASTIN TIME   TROPONIN I   CBC WITH PLATELETS AND DIFFERENTIAL   COVID-19 VIRUS (CORONAVIRUS) BY PCR        ===============================================================  RADIOLOGY       Reviewed all pertinent imaging. Please see official radiology report.   CTA Head Neck with Contrast   Final Result   Abnormal   IMPRESSION:    NONCONTRAST HEAD CT:   1.  Unremarkable examination for age. No acute intracranial process.      [Critical Result: Results from the CT noncontrast head]      Finding was identified on 8/4/2022 5:30 PM.       Dr. Purvi Jordan was contacted by me on 8/4/2022 5:36 PM and verbalized understanding of the critical result.       HEAD CTA:    1.  No significant stenosis, aneurysm, or high flow vascular malformation identified.      NECK CTA:   1.  No significant carotid stenosis.   2.  Right dominant vertebral artery is patent. Diminutive appearance of the left vertebral artery with some areas not well visualized/opacified. This may be on a developmental basis or related to stenosis/chronic occlusion.               ================================================================  EKG     EKG reviewed interpreted by me shows sinus rhythm with a bifascicular block, rate of 64, left axis,  with no acute ST or T wave changes since December 2015 with a similar morphology    I have independently reviewed and interpreted the EKG(s) documented above.     ================================================================  PROCEDURES     Critical Care  Performed by: Purvi Jordan MD  Authorized by: Purvi Jordan MD  Total critical care time: 35 minutes  Critical care time was exclusive of separately billable procedures and treating other patients.  Critical care was necessary to treat or prevent imminent or life-threatening deterioration of the following conditions: stroke  code  Critical care was time spent personally by me on the following activities: development of treatment plan with patient or surrogate, discussions with consultants, examination of patient, evaluation of patient's response to treatment, obtaining history from patient or surrogate, ordering and performing treatments and interventions, ordering and review of laboratory studies, ordering and review of radiographic studies and re-evaluation of patient's condition, this excludes any separately billable procedures.          I, Erik Martinez, am serving as a scribe to document services personally performed by Dr. Jordan based on my observation and the provider's statements to me. I, Purvi Jordan MD attest that Erik Martinez is acting in a scribe capacity, has observed my performance of the services and has documented them in accordance with my direction.   Purvi Jordan M.D.   Emergency Medicine   [facility] EMERGENCY DEPARTMENT  [facility] [facility] [address]  [phone]  Dept: [phone]        Purvi Jordan MD  08/04/22 9331

## 2022-08-05 ENCOUNTER — APPOINTMENT (OUTPATIENT)
Dept: MRI IMAGING | Facility: HOSPITAL | Age: 87
DRG: 069 | End: 2022-08-05
Attending: INTERNAL MEDICINE
Payer: MEDICARE

## 2022-08-05 ENCOUNTER — APPOINTMENT (OUTPATIENT)
Dept: PHYSICAL THERAPY | Facility: HOSPITAL | Age: 87
DRG: 069 | End: 2022-08-05
Attending: INTERNAL MEDICINE
Payer: MEDICARE

## 2022-08-05 ENCOUNTER — APPOINTMENT (OUTPATIENT)
Dept: CARDIOLOGY | Facility: HOSPITAL | Age: 87
DRG: 069 | End: 2022-08-05
Attending: INTERNAL MEDICINE
Payer: MEDICARE

## 2022-08-05 ENCOUNTER — APPOINTMENT (OUTPATIENT)
Dept: RADIOLOGY | Facility: HOSPITAL | Age: 87
DRG: 069 | End: 2022-08-05
Attending: FAMILY MEDICINE
Payer: MEDICARE

## 2022-08-05 LAB
ANION GAP SERPL CALCULATED.3IONS-SCNC: 7 MMOL/L (ref 5–18)
ATRIAL RATE - MUSE: 64 BPM
BNP SERPL-MCNC: 187 PG/ML (ref 0–93)
BUN SERPL-MCNC: 17 MG/DL (ref 8–28)
CALCIUM SERPL-MCNC: 8.9 MG/DL (ref 8.5–10.5)
CHLORIDE BLD-SCNC: 96 MMOL/L (ref 98–107)
CO2 SERPL-SCNC: 30 MMOL/L (ref 22–31)
CREAT SERPL-MCNC: 1.26 MG/DL (ref 0.7–1.3)
DIASTOLIC BLOOD PRESSURE - MUSE: 76 MMHG
ERYTHROCYTE [DISTWIDTH] IN BLOOD BY AUTOMATED COUNT: 13.8 % (ref 10–15)
FOLATE SERPL-MCNC: 13.6 NG/ML (ref 4.6–34.8)
GFR SERPL CREATININE-BSD FRML MDRD: 54 ML/MIN/1.73M2
GLUCOSE BLD-MCNC: 144 MG/DL (ref 70–125)
GLUCOSE BLDC GLUCOMTR-MCNC: 102 MG/DL (ref 70–99)
GLUCOSE BLDC GLUCOMTR-MCNC: 110 MG/DL (ref 70–99)
GLUCOSE BLDC GLUCOMTR-MCNC: 116 MG/DL (ref 70–99)
HCT VFR BLD AUTO: 44.2 % (ref 40–53)
HGB BLD-MCNC: 15 G/DL (ref 13.3–17.7)
INTERPRETATION ECG - MUSE: NORMAL
MAGNESIUM SERPL-MCNC: 1.8 MG/DL (ref 1.8–2.6)
MCH RBC QN AUTO: 31.1 PG (ref 26.5–33)
MCHC RBC AUTO-ENTMCNC: 33.9 G/DL (ref 31.5–36.5)
MCV RBC AUTO: 92 FL (ref 78–100)
P AXIS - MUSE: 56 DEGREES
PLATELET # BLD AUTO: 205 10E3/UL (ref 150–450)
POTASSIUM BLD-SCNC: 3.9 MMOL/L (ref 3.5–5)
PR INTERVAL - MUSE: 218 MS
PROCALCITONIN SERPL-MCNC: 0.08 NG/ML (ref 0–0.49)
QRS DURATION - MUSE: 162 MS
QT - MUSE: 474 MS
QTC - MUSE: 489 MS
R AXIS - MUSE: -66 DEGREES
RBC # BLD AUTO: 4.82 10E6/UL (ref 4.4–5.9)
SODIUM SERPL-SCNC: 133 MMOL/L (ref 136–145)
SYSTOLIC BLOOD PRESSURE - MUSE: 161 MMHG
T AXIS - MUSE: -49 DEGREES
TROPONIN I SERPL-MCNC: 0.02 NG/ML (ref 0–0.29)
TSH SERPL DL<=0.005 MIU/L-ACNC: 0.7 UIU/ML (ref 0.3–5)
VENTRICULAR RATE- MUSE: 64 BPM
VIT B12 SERPL-MCNC: 1591 PG/ML (ref 232–1245)
WBC # BLD AUTO: 6.1 10E3/UL (ref 4–11)

## 2022-08-05 PROCEDURE — 255N000002 HC RX 255 OP 636: Performed by: FAMILY MEDICINE

## 2022-08-05 PROCEDURE — 99226 PR SUBSEQUENT OBSERVATION CARE,LEVEL III: CPT | Performed by: FAMILY MEDICINE

## 2022-08-05 PROCEDURE — 93306 TTE W/DOPPLER COMPLETE: CPT | Mod: 26 | Performed by: INTERNAL MEDICINE

## 2022-08-05 PROCEDURE — 80048 BASIC METABOLIC PNL TOTAL CA: CPT | Performed by: INTERNAL MEDICINE

## 2022-08-05 PROCEDURE — 70553 MRI BRAIN STEM W/O & W/DYE: CPT | Mod: MG

## 2022-08-05 PROCEDURE — G0378 HOSPITAL OBSERVATION PER HR: HCPCS

## 2022-08-05 PROCEDURE — 84443 ASSAY THYROID STIM HORMONE: CPT | Performed by: FAMILY MEDICINE

## 2022-08-05 PROCEDURE — 83880 ASSAY OF NATRIURETIC PEPTIDE: CPT | Performed by: FAMILY MEDICINE

## 2022-08-05 PROCEDURE — A9585 GADOBUTROL INJECTION: HCPCS | Performed by: FAMILY MEDICINE

## 2022-08-05 PROCEDURE — 97161 PT EVAL LOW COMPLEX 20 MIN: CPT | Mod: GP

## 2022-08-05 PROCEDURE — 250N000013 HC RX MED GY IP 250 OP 250 PS 637: Performed by: INTERNAL MEDICINE

## 2022-08-05 PROCEDURE — 999N000208 ECHOCARDIOGRAM COMPLETE

## 2022-08-05 PROCEDURE — 250N000013 HC RX MED GY IP 250 OP 250 PS 637: Performed by: FAMILY MEDICINE

## 2022-08-05 PROCEDURE — 96372 THER/PROPH/DIAG INJ SC/IM: CPT | Performed by: FAMILY MEDICINE

## 2022-08-05 PROCEDURE — 96374 THER/PROPH/DIAG INJ IV PUSH: CPT

## 2022-08-05 PROCEDURE — 85027 COMPLETE CBC AUTOMATED: CPT | Performed by: INTERNAL MEDICINE

## 2022-08-05 PROCEDURE — 255N000002 HC RX 255 OP 636: Performed by: INTERNAL MEDICINE

## 2022-08-05 PROCEDURE — 82746 ASSAY OF FOLIC ACID SERUM: CPT | Performed by: FAMILY MEDICINE

## 2022-08-05 PROCEDURE — 99223 1ST HOSP IP/OBS HIGH 75: CPT | Performed by: PSYCHIATRY & NEUROLOGY

## 2022-08-05 PROCEDURE — 250N000011 HC RX IP 250 OP 636: Performed by: FAMILY MEDICINE

## 2022-08-05 PROCEDURE — 84484 ASSAY OF TROPONIN QUANT: CPT | Performed by: INTERNAL MEDICINE

## 2022-08-05 PROCEDURE — 82607 VITAMIN B-12: CPT | Performed by: FAMILY MEDICINE

## 2022-08-05 PROCEDURE — 71045 X-RAY EXAM CHEST 1 VIEW: CPT

## 2022-08-05 PROCEDURE — 82962 GLUCOSE BLOOD TEST: CPT

## 2022-08-05 PROCEDURE — G1010 CDSM STANSON: HCPCS

## 2022-08-05 PROCEDURE — 250N000012 HC RX MED GY IP 250 OP 636 PS 637: Performed by: INTERNAL MEDICINE

## 2022-08-05 PROCEDURE — 36415 COLL VENOUS BLD VENIPUNCTURE: CPT | Performed by: INTERNAL MEDICINE

## 2022-08-05 RX ORDER — CLOPIDOGREL BISULFATE 75 MG/1
75 TABLET ORAL DAILY
Status: DISCONTINUED | OUTPATIENT
Start: 2022-08-05 | End: 2022-08-08 | Stop reason: HOSPADM

## 2022-08-05 RX ORDER — ASPIRIN 81 MG/1
81 TABLET ORAL DAILY
Status: DISCONTINUED | OUTPATIENT
Start: 2022-08-05 | End: 2022-08-08 | Stop reason: HOSPADM

## 2022-08-05 RX ORDER — HEPARIN SODIUM 5000 [USP'U]/.5ML
5000 INJECTION, SOLUTION INTRAVENOUS; SUBCUTANEOUS EVERY 12 HOURS SCHEDULED
Status: DISCONTINUED | OUTPATIENT
Start: 2022-08-05 | End: 2022-08-07

## 2022-08-05 RX ORDER — GADOBUTROL 604.72 MG/ML
7 INJECTION INTRAVENOUS ONCE
Status: COMPLETED | OUTPATIENT
Start: 2022-08-05 | End: 2022-08-05

## 2022-08-05 RX ORDER — FUROSEMIDE 10 MG/ML
40 INJECTION INTRAMUSCULAR; INTRAVENOUS ONCE
Status: COMPLETED | OUTPATIENT
Start: 2022-08-05 | End: 2022-08-05

## 2022-08-05 RX ADMIN — CLOPIDOGREL BISULFATE 75 MG: 75 TABLET ORAL at 16:35

## 2022-08-05 RX ADMIN — AMLODIPINE BESYLATE 5 MG: 5 TABLET ORAL at 09:21

## 2022-08-05 RX ADMIN — PERFLUTREN 2 ML: 6.52 INJECTION, SUSPENSION INTRAVENOUS at 14:30

## 2022-08-05 RX ADMIN — SIMVASTATIN 20 MG: 10 TABLET, FILM COATED ORAL at 02:00

## 2022-08-05 RX ADMIN — ASPIRIN 81 MG: 81 TABLET, COATED ORAL at 16:35

## 2022-08-05 RX ADMIN — Medication 1 CAPSULE: at 09:22

## 2022-08-05 RX ADMIN — HEPARIN SODIUM 5000 UNITS: 10000 INJECTION, SOLUTION INTRAVENOUS; SUBCUTANEOUS at 21:27

## 2022-08-05 RX ADMIN — GADOBUTROL 7 ML: 604.72 INJECTION INTRAVENOUS at 07:52

## 2022-08-05 RX ADMIN — Medication 1 CAPSULE: at 02:00

## 2022-08-05 RX ADMIN — FUROSEMIDE 40 MG: 10 INJECTION, SOLUTION INTRAMUSCULAR; INTRAVENOUS at 16:37

## 2022-08-05 RX ADMIN — ATENOLOL 50 MG: 25 TABLET ORAL at 09:22

## 2022-08-05 RX ADMIN — SIMVASTATIN 20 MG: 10 TABLET, FILM COATED ORAL at 21:26

## 2022-08-05 RX ADMIN — PREDNISONE 4 MG: 1 TABLET ORAL at 09:21

## 2022-08-05 RX ADMIN — Medication 1 CAPSULE: at 21:29

## 2022-08-05 RX ADMIN — HEPARIN SODIUM 5000 UNITS: 10000 INJECTION, SOLUTION INTRAVENOUS; SUBCUTANEOUS at 14:32

## 2022-08-05 ASSESSMENT — ACTIVITIES OF DAILY LIVING (ADL): DEPENDENT_IADLS:: INDEPENDENT

## 2022-08-05 NOTE — PHARMACY-ADMISSION MEDICATION HISTORY
Pharmacy Note - Admission Medication History    ______________________________________________________________________    Prior To Admission (PTA) med list completed and updated in EMR.       PTA Med List   Medication Sig Note Last Dose     amLODIPine (NORVASC) 5 MG tablet Take 5 mg by mouth daily  8/4/2022 at am     atenolol (TENORMIN) 25 MG tablet Take 50 mg by mouth daily  8/4/2022 at am     cyanocobalamin 1,000 mcg/mL injection [CYANOCOBALAMIN 1,000 MCG/ML INJECTION] Inject 1,000 mcg into the shoulder, thigh, or buttocks every 30 (thirty) days.  Past Week at 8/01     etanercept (ENBREL) 50 mg/mL (0.98 mL) injection [ETANERCEPT (ENBREL) 50 MG/ML (0.98 ML) INJECTION] Inject 50 mg under the skin once a week. 8/4/2022: Due 8/4, but did not get dose before coming to ED Past Week at 7/28     hydrochlorothiazide (HYDRODIURIL) 25 MG tablet Take 25 mg by mouth daily  8/4/2022 at am     loperamide (IMODIUM A-D) 2 mg tablet Take 4 mg by mouth daily as needed 8/4/2022: Not scheduled, but uses almost Daily 8/3/2022 at Unknown time     Multiple Vitamins-Minerals (PRESERVISION AREDS 2) CAPS Take 1 capsule by mouth 2 times daily  8/4/2022 at am     predniSONE (DELTASONE) 1 MG tablet Take 4 mg by mouth daily 8/4/2022: Slowly weaning off 8/4/2022 at am     Simethicone 125 MG CAPS Take 1 capsule by mouth 2 times daily as needed  Past Week at Unknown time     simvastatin (ZOCOR) 20 MG tablet Take 20 mg by mouth At Bedtime  8/3/2022 at hs       Information source(s): Patient, Family member and CareEverywhere/SureScripts  Method of interview communication: in-person    Summary of Changes to PTA Med List  New: amlodipine, hydrochlorothiazide, simvastatin, prednisone, AREDS, Gas-X  Discontinued: Citrucel, loratadine  Changed: atenolol dose, loperamide dose    Patient was asked about OTC/herbal products specifically.  PTA med list reflects this.    In the past week, patient estimated taking medication this percent of the time:  greater  than 90%.    Allergies were reviewed, assessed, and updated with the patient.      Patient does not anticipate needing any multi-use medications during admission.    The information provided in this note is only as accurate as the sources available at the time of the update(s).    Thank you,  Minerva Montano, [facility]  8/4/2022 7:30 PM

## 2022-08-05 NOTE — CONSULTS
Care Management Initial Consult    General Information  Assessment completed with: Patient, Spouse or significant other, pt and wife Susy on phone  Type of CM/SW Visit: Initial Assessment    Primary Care Provider verified and updated as needed: Yes   Readmission within the last 30 days: no previous admission in last 30 days   Return Category: Progression of disease  Reason for Consult: discharge planning  Advance Care Planning: Advance Care Planning Reviewed: verified with patient     General Information Comments: lives w/wife and independent and active    Communication Assessment  Patient's communication style: spoken language (English or Bilingual)             Cognitive  Cognitive/Neuro/Behavioral: WDL  Level of Consciousness: alert     Orientation: oriented x 4             Living Environment:   People in home: spouse     Current living Arrangements: house      Able to return to prior arrangements: yes       Family/Social Support:  Care provided by: self  Provides care for: no one  Marital Status:   Wife          Description of Support System: Supportive, Involved    Support Assessment: Adequate family and caregiver support, Adequate social supports    Current Resources:   Patient receiving home care services: No     Community Resources: None  Equipment currently used at home: none  Supplies currently used at home: None    Employment/Financial:  Employment Status:          Financial Concerns: No concerns identified   Referral to Financial Worker: No       Lifestyle & Psychosocial Needs:  Social Determinants of Health     Tobacco Use: Medium Risk     Smoking Tobacco Use: Former Smoker     Smokeless Tobacco Use: Unknown   Alcohol Use: Not on file   Financial Resource Strain: Not on file   Food Insecurity: Not on file   Transportation Needs: Not on file   Physical Activity: Not on file   Stress: Not on file   Social Connections: Not on file   Intimate Partner Violence: Not on file   Depression: Not on file    Housing Stability: Not on file       Functional Status:  Prior to admission patient needed assistance:   Dependent ADLs:: Independent  Dependent IADLs:: Independent  Assesssment of Functional Status: Not at baseline with mobility    Mental Health Status:  Mental Health Status: No Current Concerns       Chemical Dependency Status:                Values/Beliefs:  Spiritual, Cultural Beliefs, [employer] Practices, Values that affect care:                 Additional Information:  Assessed, lives w/wife, independent at baseline and wife Susy to transport. Discussed [facility] and [facility] Not[facility]# [id number].  COVID + for two weeks now. Asymptomatic positive result 8/4/22.      Mckenna Nicholas RN

## 2022-08-05 NOTE — PROGRESS NOTES
[facility]    Medicine Progress Note - Hospitalist Service       Date of Admission:  8/4/2022  Active Problems:    TIA (transient ischemic attack)     Assessment & Plan            Jeffy Olson is a 89 year old male with past medical history significant for diagnosis of COVID on 7/21/2022, TIA, rheumatoid arthritis, BPH, hypertension, and basal cell skin cancer who was admitted on 8/4/2022 for possible TIA. He had an episode of memory loss with aphasia.  Patient also was found to be in asymptomatic hypoxic respiratory failure    TIA  -Stroke protocol in place  -Neurology consulted  -Loaded with Plavix 300 mg and started on 75 mg daily, simvastatin 20 mg at bedtime, aspirin  -MRI brain with and without contrast shows no acute infarct  Cardiac echocardiogram unremarkable  TSH, B12 unremarkable  -A1c 8/4: 5.6  -Lipid panel 8/4: LDL 69, HDL 62, triglycerides 96  -PT and OT ordered  -CT head 8/4: Negative for acute process, diminutive left vertebral artery that has been present since 2015  Monitor on telemetry    Acute hypoxic respiratory failure-  BNP mildly elevated, chest x-ray shows signs of pulmonary congestion but no acute infiltrate.  Had COVID over 2 weeks ago.  Cardiac echocardiogram shows normal ejection fraction, suspect acute diastolic CHF exacerbation  Give Lasix 40 mg IV, monitor.  Doubt PE or pneumonia.  Check procalcitonin.     Essential hypertension  -Stable.  Continue amlodipine, atenolol.  Hold hydrochlorothiazide.     Rheumatoid arthritis  -On Enbrel and prednisone.  Stable.     # Confirmed COVID-19 infection       Symptom Onset 07/19/2022   Date of 1st Positive Test  07/21/2022   Vaccination Status Fully Vaccinated         Asymptomatic except for very slight cough.  Original symptoms were cough and sore throat and malaise.  No chest pain or shortness of breath but is mildly hypoxic.  Doubt PE.  No leg pain.  Outside the window for any medications.  However will have to  check to see if he needs to be in precautions for 20 days instead of 10 days due to his rheumatoid arthritis and use of Enbrel and prednisone.    Chronic kidney disease stage III-stable, at baseline     Diet: Regular Diet Adult    DVT Prophylaxis: Heparin SQ  Teresa Catheter: Not present  Central Lines: None  Code Status: Full Code      Disposition Plan      Expected Discharge Date: 08/06/2022                The patient's care was discussed with the Bedside Nurse, Care Coordinator/ and Patient. for total time 35 minutes with greater than 50% of total time spent in counseling and coordination of care.    DARLENE TIRADO MD  Hospitalist Service  [facility]  Securely message with the Vocera Web Console (learn more here)  Text page via [employer] Paging/Directory        Clinically Significant Risk Factors Present on Admission                          ______________________________________________________________________    Interval History   Remainder of 12 point review of systems negative except as noted below    Subjective:  Patient doing well.  No further episodes of difficulty word finding or confusion.  Denies chest pain or shortness of breath, leg pain, orthopnea.  Is hypoxic.  Has minimal nonproductive cough.  Original COVID symptoms were cough and sore throat and malaise, much improved.  Was diagnosed on 7/21 but had symptoms a few days earlier.  Is immunized and boosted    Data reviewed today: I reviewed all medications, new labs and imaging results over the last 24 hours.     Physical Exam   Vital Signs: Temp: 97.9  F (36.6  C) Temp src: Oral BP: 123/65 Pulse: 64   Resp: 20 SpO2: 90 % O2 Device: Nasal cannula Oxygen Delivery: 2 LPM  Weight: 156 lbs 0 oz  Physical Exam:  Temp:  [97.7  F (36.5  C)-99.6  F (37.6  C)] 97.9  F (36.6  C)  Pulse:  [] 64  Resp:  [16-40] 20  BP: (123-187)/(63-86) 123/65  SpO2:  [79 %-97 %] 90 %    /65 (BP Location: Left arm)   Pulse 64    "Temp 97.9  F (36.6  C) (Oral)   Resp 20   Ht 1.778 m (5' 10\")   Wt 70.8 kg (156 lb)   SpO2 90%   BMI 22.38 kg/m    General appearance: alert, appears stated age and cooperative  Head: Normocephalic, without obvious abnormality, atraumatic  Eyes: Clear conjuctiva  Neck: no JVD and supple, symmetrical, trachea midline  Lungs: rales bibasilar  Heart: regular rate and rhythm, S1, S2 normal, no murmur, click, rub or gallop  Abdomen: soft, non-tender; bowel sounds normal; no masses,  no organomegaly  Extremities: Negative homans,   Skin: Skin color, texture, turgor normal. No rashes or lesions  Neurologic: Mental status: Alert, oriented, thought content appropriate  Cranial nerves: normal  Sensory: normal  Motor:grossly normal          Data   Recent Labs   Lab 08/05/22  1157 08/05/22  1045 08/04/22  1739   WBC  --  6.1 7.6   HGB  --  15.0 15.7   MCV  --  92 91   PLT  --  205 228   INR  --   --  0.95   NA  --  133* 133*   POTASSIUM  --  3.9 3.6   CHLORIDE  --  96* 95*   CO2  --  30 30   BUN  --  17 20   CR  --  1.26 1.33*   ANIONGAP  --  7 8   SELMA  --  8.9 9.2   * 144* 92     Recent Results (from the past 24 hour(s))   CTA Head Neck with Contrast   Result Value    Radiologist flags Results from the CT noncontrast head (AA)    Narrative    EXAM: CTA HEAD NECK W CONTRAST  LOCATION: [facility]  DATE/TIME: 8/4/2022 5:20 PM    INDICATION: Transient speech changes.    COMPARISON: CT head without contrast 12/24/2015.    CONTRAST: 75 mL Isovue 370.    TECHNIQUE: Head and neck CT angiogram with IV contrast. Noncontrast head CT followed by axial helical CT images of the head and neck vessels obtained during the arterial phase of intravenous contrast administration. Axial 2D reconstructed images and   multiplanar 3D MIP reconstructed images of the head and neck vessels were performed by the technologist. Dose reduction techniques were used. All stenosis measurements made according to NASCET " criteria unless otherwise specified.    FINDINGS:   NONCONTRAST HEAD CT:   INTRACRANIAL CONTENTS: No intracranial hemorrhage, extra-axial collection, or mass effect. No CT evidence of acute infarct. Mild presumed chronic small vessel ischemic changes. Mild generalized volume loss. No hydrocephalus.     VISUALIZED ORBITS/SINUSES/MASTOIDS: Prior bilateral cataract surgery. Visualized portions of the orbits are otherwise unremarkable. Mild mucosal thickening scattered about the paranasal sinuses. No middle ear or mastoid effusion.    BONES/SOFT TISSUES: No acute abnormality.    HEAD CTA:  ANTERIOR CIRCULATION: No stenosis/occlusion, aneurysm, or high flow vascular malformation. Standard [place] anatomy.    POSTERIOR CIRCULATION: No stenosis/occlusion, aneurysm, or high flow vascular malformation. Dominant right and smaller left vertebral artery contribute to a normal basilar artery.     DURAL VENOUS SINUSES: Not well evaluated on a technical basis.    NECK CTA:  RIGHT CAROTID: No measurable stenosis or dissection.    LEFT CAROTID: No measurable stenosis or dissection.    VERTEBRAL ARTERIES: No focal stenosis or dissection on the right. Dominant right vertebral artery. Diminutive appearance of the left vertebral artery with some areas not well visualized/opacified.     AORTIC ARCH: Classic aortic arch anatomy with no significant stenosis at the origin of the great vessels.    NONVASCULAR STRUCTURES: Emphysema. Osseous fusion of C5 to C7 on a chronic basis.      Impression    IMPRESSION:   NONCONTRAST HEAD CT:  1.  Unremarkable examination for age. No acute intracranial process.    [Critical Result: Results from the CT noncontrast head]    Finding was identified on 8/4/2022 5:30 PM.     Dr. Purvi Jordan was contacted by me on 8/4/2022 5:36 PM and verbalized understanding of the critical result.     HEAD CTA:   1.  No significant stenosis, aneurysm, or high flow vascular malformation identified.    NECK CTA:  1.   No significant carotid stenosis.  2.  Right dominant vertebral artery is patent. Diminutive appearance of the left vertebral artery with some areas not well visualized/opacified. This may be on a developmental basis or related to stenosis/chronic occlusion.     MR Brain w/o & w Contrast    Narrative    EXAM: MR BRAIN W/O and W CONTRAST  LOCATION: [facility]  DATE/TIME: 8/5/2022 6:49 AM    INDICATION: Transient ischemic attack. Speech difficulty. Transient aphasia and confusion.  COMPARISON: Head and neck CTA 8/4/2022, brain MRI 12/16/2015  CONTRAST: 7ml gadavist   TECHNIQUE: Routine multiplanar multisequence head MRI without and with intravenous contrast.    FINDINGS:  There is no restricted diffusion. Paranasal sinuses are free from significant disease. Mastoid air cells appear free from significant disease. Intraorbital contents are unremarkable. There is mild to moderate generalized prominence of the sulci and   ventricles, consistent with underlying volume loss. Intracranial flow voids are intact. There is no mass effect, midline shift, or extraaxial collection. Mild periventricular T2 hyperintensity most consistent with chronic small vessel ischemic related   gliosis. No evidence for acute or chronic intracranial blood products.      Impression    IMPRESSION:  1.  No acute intracranial finding. No evidence for recent ischemia, intracranial hemorrhage, or mass.   XR Chest Port 1 View    Narrative    EXAM: XR CHEST PORT 1 VIEW  LOCATION: [facility]  DATE/TIME: 8/5/2022 12:51 PM    INDICATION: TIA  COMPARISON: 08/03/2022      Impression    IMPRESSION: Increasing prominence pulmonary vessels could represent early pulmonary vascular congestion. No pleural effusion.    Stable heart size. Interstitial prominence could represent edema, pneumonia, or chronic interstitial lung disease.   Echocardiogram Complete    Narrative     [id number]  [id number]  [id number]  [id number]^SARAH^YOVANY     [id number] [place] [address]     Name: EDUARDO DEVRIES  MRN: [id number]  : 1931  Study Date: 2022 01:56 PM  Age: 89 yrs  Gender: Male  Patient Location: [facility]  Reason For Study: TIA  Ordering Physician: YOVANY SMITH  Performed By: ANA PAULA     BSA: 1.9 m2  Height: 70 in  Weight: 165 lb  HR: 53  BP: 156/71 mmHg  ______________________________________________________________________________  Procedure  Complete Portable Echo Adult. Definity (NDC #34569-845) given intravenously.  ______________________________________________________________________________  Interpretation Summary     1. Normal left ventricular size and systolic performance with a visually  estimated ejection fraction of 55%.  2. There is mild aortic insufficiency.  3. Normal right ventricular size and systolic performance.  4. There is mild left atrial enlargement.  5. There is mild enlargement of the proximal ascending aorta.  ______________________________________________________________________________  Left ventricle:  Normal left ventricular size and systolic performance with a visually  estimated ejection fraction of 55%. There is normal regional wall motion. Left  ventricular wall thickness is normal.     Assessment of LV Diastolic Function: The cumulative findings suggest normal  diastolic filling [The septal e' velocity is < 7 cm/s & lateral e' velocity  is  < 10 cm/s. The average E/e' is < 14. The TR velocity cannot be determined due  to insufficient tricuspid insufficiency signal. Left atrial volume index is  less than 34 mL/mÂ ].     Right ventricle:  Normal right ventricular size and systolic performance.     Left atrium:  There is mild left atrial enlargement.     Right atrium:  There is borderline right atrial enlargement.     IVC:  The IVC is of normal caliber.     Aortic valve:  The aortic valve is comprised of three cusps. There  is mild aortic valve  sclerosis without significant stenosis. There is mild aortic insufficiency.     Mitral valve:  The mitral valve appears morphologically normal. There is trace mitral  insufficiency.     Tricuspid valve:  The tricuspid valve is grossly morphologically normal. There is trace  tricuspid insufficiency.     Pulmonic valve:  The pulmonic valve is grossly morphologically normal. There is trace pulmonic  insufficiency.     Thoracic aorta:  There is mild enlargement of the proximal ascending aorta.     Pericardium:  There is no significant pericardial effusion.  ______________________________________________________________________________  ______________________________________________________________________________  MMode/2D Measurements & Calculations  IVSd: 1.1 cm  LVIDd: 5.6 cm  LVIDs: 3.9 cm  LVPWd: 1.0 cm  FS: 29.8 %  LV mass(C)d: 232.5 grams  LV mass(C)dI: 120.9 grams/m2  Ao root diam: 3.7 cm  LA dimension: 5.0 cm  asc Aorta Diam: 4.0 cm  LA/Ao: 1.4  LVOT diam: 2.2 cm  LVOT area: 3.9 cm2  LA Volume Indexed (AL/bp): 30.4 ml/m2     RWT: 0.36     Time Measurements  Aortic HR: 64.0 BPM  MM HR: 62.0 BPM     Doppler Measurements & Calculations  MV E max chidi: 53.1 cm/sec  MV A max chidi: 106.7 cm/sec  MV E/A: 0.50  MV dec slope: 191.0 cm/sec2  MV dec time: 0.28 sec  Ao V2 max: 175.1 cm/sec  Ao max P.0 mmHg  Ao V2 mean: 127.3 cm/sec  Ao mean P.1 mmHg  Ao V2 VTI: 36.3 cm  SUSSY(I,D): 2.0 cm2  SUSSY(V,D): 2.0 cm2  AI P1/2t: 405.8 msec  LV V1 max PG: 3.3 mmHg  LV V1 max: 90.4 cm/sec  LV V1 VTI: 19.0 cm  CO(LVOT): 4.7 l/min  CI(LVOT): 2.4 l/min/m2  SV(LVOT): 73.4 ml  SI(LVOT): 38.2 ml/m2  PA acc time: 0.10 sec  AV Chidi Ratio (DI): 0.52  SUSSY Index (cm2/m2): 1.1     E/E': 10.4  E/E' avg: 10.4  Lateral E/e': 9.8  Medial E/e': 11.1  Peak E' Chidi: 5.1 cm/sec     ______________________________________________________________________________  Report approved by: Ole Bartlett 2022 03:19 PM

## 2022-08-05 NOTE — PROGRESS NOTES
Pt slept very little overnight and is hopeful he may get an inpatient room this AM. Neuros remain intact, no deficits noted, alert, oriented, pt reports symptoms that he presented with have resolved completely. Pt down for MRI this AM, pt denies any pain and VSS. Up to commode this AM with SBA and appears stable, had medium soft brown BM.

## 2022-08-05 NOTE — PROGRESS NOTES
08/05/22 0900   Quick Adds   Type of Visit Initial PT Evaluation   Living Environment   People in Home spouse   Current Living Arrangements house   Home Accessibility stairs to enter home;stairs within home   Number of Stairs, Main Entrance 2   Stair Railings, Main Entrance railings safe and in good condition   Number of Stairs, Within Home, Primary greater than 10 stairs   Stair Railings, Within Home, Primary railings safe and in good condition   Transportation Anticipated car, drives self;family or friend will provide   Living Environment Comments bedroom on main level   Self-Care   Equipment Currently Used at Home none   Activity/Exercise/Self-Care Comment independent ADL's/IADL's; drives, does yard work   General Information   Onset of Illness/Injury or Date of Surgery 08/04/22   Referring Physician Dr. Gautam   Patient/Family Therapy Goals Statement (PT) none stated   Pertinent History of Current Problem (include personal factors and/or comorbidities that impact the POC) TIA, memory loss and aphasia now resolved; pt reports similar event in 2015; PMH of TIA, RA, BPH, HTN, basal cell skin CA   Existing Precautions/Restrictions no known precautions/restrictions   Cognition   Affect/Mental Status (Cognition) WFL   Orientation Status (Cognition) oriented x 4   Follows Commands (Cognition) WFL   Range of Motion (ROM)   Range of Motion ROM is WFL   Strength (Manual Muscle Testing)   Strength (Manual Muscle Testing) strength is WFL   Bed Mobility   Bed Mobility no deficits identified   Transfers   Transfers no deficits identified   Gait/Stairs (Locomotion)   [place] Level (Gait) independent   Assistive Device (Gait) other (see comments)  (none)   Distance in Feet (Required for LE Total Joints) 40   Pattern (Gait) step-through   Deviations/Abnormal Patterns (Gait) other (see comments)  (none)   Balance   Balance Comments feet together eyes open independent; feet apart eyes closed independent; post. LOB with  feet together eyes closed   Clinical Impression   Criteria for Skilled Therapeutic Intervention Evaluation only   Clinical Presentation (PT Evaluation Complexity) Stable/Uncomplicated   Clinical Presentation Rationale pt presents as medically diagnosed   Clinical Decision Making (Complexity) low complexity   Anticipated Equipment Needs at Discharge (PT) other (see comments)  (none)   Risk & Benefits of therapy have been explained evaluation/treatment results reviewed;patient   PT Discharge Planning   PT Discharge Recommendation (DC Rec) home   Total Evaluation Time   Total Evaluation Time (Minutes) 10

## 2022-08-05 NOTE — H&P
[facility]    History and Physical - Hospitalist Service       Date of Admission:  8/4/2022    Assessment & Plan      Jeffy Olson is a 89 year old male with past medical history significant for TIA, rheumatoid arthritis, BPH, hypertension, and basal cell skin cancer who was admitted on 8/4/2022 for possible TIA. He had an episode of memory loss with aphasia.  Developed symptoms of COVID approximately 2 weeks ago and tested positive at an urgent care, has not felt back to baseline yet.     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    Essential hypertension  -Amlodipine 5 mg daily, atenolol 50 mg daily, hydrochlorothiazide 25 mg daily  -Hydralazine 10 mg IV every 6 as needed for SBP greater than 200    Rheumatoid arthritis  -On Enbrel and do for the medication but on outpatient status, will need to have someone bring this in for him if he is to stay for several days  -Prednisone 4 mg daily, is currently on a taper    # Confirmed COVID-19 infection       Symptom Onset 07/22/2022   Date of 1st Positive Test unsure, around 07/22/2022   Vaccination Status Fully Vaccinated            Diet: Regular Diet Adult    DVT Prophylaxis: Pneumatic Compression Devices  Teresa Catheter: Not present  Central Lines: None  Cardiac Monitoring: ACTIVE order. Indication: Stroke, acute (48 hours)  Code Status: Full Code    Clinically Significant Risk Factors Present on Admission                          Disposition Plan      Expected Discharge Date: 08/06/2022                The patient's care was discussed with the Patient.    Summer Jauregui MD  Hospitalist Service    [facility]  Securely message with the [employer] Web Console (learn more here)  Text page via [employer] Paging/Directory         ______________________________________________________________________    Chief Complaint   Confusion, memory loss, aphasia    History is obtained from the patient    History of Present Illness   Jeffy Olson is a 89 year old male who was getting into the car with his granddaughter and developed aphasia with confusion and memory loss.  He was just not able to remember who his granddaughter was at that time.  He was brought to the ED and by the time he was evaluated he was no longer having the symptoms.  He has had a TIA in the past with a negative work-up.  He was found to have a diminutive left vertebral artery in 2015 that is present today.  At the time of exam he was no longer having any symptoms related to why he came in.  He has been feeling generally unwell over the past 2 weeks since being diagnosed with COVID.  He states that he is fully vaccinated but did have symptoms which prompted testing at an urgent care.  He states that in the past when he had the TIA he was initially diagnosed with dementia but then had follow-up neurocognitive testing and had no signs of dementia so was likely just related to the TIA.    Review of Systems    The 10 point Review of Systems is negative other than noted in the HPI.    Past Medical History    I have reviewed this patient's medical history and updated it with pertinent information if needed.   No past medical history on file.    Past Surgical History   I have reviewed this patient's surgical history and updated it with pertinent information if needed.  Past Surgical History:   Procedure Laterality Date     CATARACT EXTRACTION Bilateral      CERVICAL FUSION       HAND SURGERY Bilateral      HEMORRHOIDECTOMY EXTERNAL       HERNIA REPAIR      x2     RELEASE CARPAL TUNNEL Right      REPLACEMENT TOTAL KNEE Bilateral        Social  History   I have reviewed this patient's social history and updated it with pertinent information if needed.  Social History     Tobacco Use     Smoking status: Former Smoker     Quit date:      Years since quittin.6   Substance Use Topics     Alcohol use: No     Comment: Alcoholic Drinks/day: Hasn't drank in a 1.5 weeks, since last admission     Drug use: No       Family History       Prior to Admission Medications   Prior to Admission Medications   Prescriptions Last Dose Informant Patient Reported? Taking?   Multiple Vitamins-Minerals (PRESERVISION AREDS 2) CAPS 2022 at am  Yes Yes   Sig: Take 1 capsule by mouth 2 times daily   Simethicone 125 MG CAPS Past Week at Unknown time  Yes Yes   Sig: Take 1 capsule by mouth 2 times daily as needed   amLODIPine (NORVASC) 5 MG tablet 2022 at am  Yes Yes   Sig: Take 5 mg by mouth daily   atenolol (TENORMIN) 25 MG tablet 2022 at am  Yes Yes   Sig: Take 50 mg by mouth daily   cyanocobalamin 1,000 mcg/mL injection Past Week at   Yes Yes   Sig: [CYANOCOBALAMIN 1,000 MCG/ML INJECTION] Inject 1,000 mcg into the shoulder, thigh, or buttocks every 30 (thirty) days.   etanercept (ENBREL) 50 mg/mL (0.98 mL) injection Past Week at   Yes Yes   Sig: [ETANERCEPT (ENBREL) 50 MG/ML (0.98 ML) INJECTION] Inject 50 mg under the skin once a week.   hydrochlorothiazide (HYDRODIURIL) 25 MG tablet 2022 at am  Yes Yes   Sig: Take 25 mg by mouth daily   loperamide (IMODIUM A-D) 2 mg tablet 8/3/2022 at Unknown time  Yes Yes   Sig: Take 4 mg by mouth daily as needed   methylcellulose (CITRUCEL) 500 mg Tab   Yes No   Sig: [METHYLCELLULOSE (CITRUCEL) 500 MG TAB] Take 1,000 mg by mouth daily.   predniSONE (DELTASONE) 1 MG tablet 2022 at am  Yes Yes   Sig: Take 4 mg by mouth daily   simvastatin (ZOCOR) 20 MG tablet 8/3/2022 at hs  Yes Yes   Sig: Take 20 mg by mouth At Bedtime      Facility-Administered Medications: None     Allergies   No Known Allergies    Physical  Exam   Vital Signs: Temp: 97.8  F (36.6  C) Temp src: Oral BP: (!) 163/73 Pulse: 52   Resp: 20 SpO2: 95 % O2 Device: Nasal cannula Oxygen Delivery: 3 LPM  Weight: 165 lbs 0 oz    Constitutional: awake, alert, cooperative, no apparent distress, and appears stated age  Eyes: Lids and lashes normal, pupils equal, round, extra ocular muscles intact, sclera clear, conjunctiva normal  Respiratory: No increased work of breathing, good air exchange, clear to auscultation bilaterally, no crackles or wheezing  Cardiovascular: Normal apical impulse, regular rate and rhythm, and no murmur noted  GI: normal bowel sounds, soft, non-distended, non-tender  Skin: normal skin color, texture, turgor, no rashes and no jaundice  Musculoskeletal: no lower extremity pitting edema present  tone is normal, no weakness noted  Neurologic: Awake, alert, oriented to name, place and time. No gross focal abnormalities   Neuropsychiatric: Appropriate mood and affect    Data   Data reviewed today: I reviewed all medications, new labs and imaging results over the last 24 hours. I personally reviewed no images or EKG's today.    Recent Labs   Lab 08/04/22  1739 08/04/22  1713   WBC 7.6  --    HGB 15.7  --    MCV 91  --      --    INR 0.95  --    *  --    POTASSIUM 3.6  --    CHLORIDE 95*  --    CO2 30  --    BUN 20  --    CR 1.33*  --    ANIONGAP 8  --    SELMA 9.2  --    GLC 92 96     Recent Results (from the past 24 hour(s))   CTA Head Neck with Contrast   Result Value    Radiologist flags Results from the CT noncontrast head (AA)    Narrative    EXAM: CTA HEAD NECK W CONTRAST  LOCATION: [facility]  DATE/TIME: 8/4/2022 5:20 PM    INDICATION: Transient speech changes.    COMPARISON: CT head without contrast 12/24/2015.    CONTRAST: 75 mL Isovue 370.    TECHNIQUE: Head and neck CT angiogram with IV contrast. Noncontrast head CT followed by axial helical CT images of the head and neck vessels obtained during the  arterial phase of intravenous contrast administration. Axial 2D reconstructed images and   multiplanar 3D MIP reconstructed images of the head and neck vessels were performed by the technologist. Dose reduction techniques were used. All stenosis measurements made according to NASCET criteria unless otherwise specified.    FINDINGS:   NONCONTRAST HEAD CT:   INTRACRANIAL CONTENTS: No intracranial hemorrhage, extra-axial collection, or mass effect. No CT evidence of acute infarct. Mild presumed chronic small vessel ischemic changes. Mild generalized volume loss. No hydrocephalus.     VISUALIZED ORBITS/SINUSES/MASTOIDS: Prior bilateral cataract surgery. Visualized portions of the orbits are otherwise unremarkable. Mild mucosal thickening scattered about the paranasal sinuses. No middle ear or mastoid effusion.    BONES/SOFT TISSUES: No acute abnormality.    HEAD CTA:  ANTERIOR CIRCULATION: No stenosis/occlusion, aneurysm, or high flow vascular malformation. Standard [place] anatomy.    POSTERIOR CIRCULATION: No stenosis/occlusion, aneurysm, or high flow vascular malformation. Dominant right and smaller left vertebral artery contribute to a normal basilar artery.     DURAL VENOUS SINUSES: Not well evaluated on a technical basis.    NECK CTA:  RIGHT CAROTID: No measurable stenosis or dissection.    LEFT CAROTID: No measurable stenosis or dissection.    VERTEBRAL ARTERIES: No focal stenosis or dissection on the right. Dominant right vertebral artery. Diminutive appearance of the left vertebral artery with some areas not well visualized/opacified.     AORTIC ARCH: Classic aortic arch anatomy with no significant stenosis at the origin of the great vessels.    NONVASCULAR STRUCTURES: Emphysema. Osseous fusion of C5 to C7 on a chronic basis.      Impression    IMPRESSION:   NONCONTRAST HEAD CT:  1.  Unremarkable examination for age. No acute intracranial process.    [Critical Result: Results from the CT noncontrast  head]    Finding was identified on 8/4/2022 5:30 PM.     Dr. Purvi Jordan was contacted by me on 8/4/2022 5:36 PM and verbalized understanding of the critical result.     HEAD CTA:   1.  No significant stenosis, aneurysm, or high flow vascular malformation identified.    NECK CTA:  1.  No significant carotid stenosis.  2.  Right dominant vertebral artery is patent. Diminutive appearance of the left vertebral artery with some areas not well visualized/opacified. This may be on a developmental basis or related to stenosis/chronic occlusion.

## 2022-08-05 NOTE — PLAN OF CARE
PRIMARY DIAGNOSIS: GENERALIZED WEAKNESS    OUTPATIENT/OBSERVATION GOALS TO BE MET BEFORE DISCHARGE  1. Orthostatic performed: No    2. Tolerating PO medications: Yes    3. Return to near baseline physical activity: Yes    4. Cleared for discharge by consultants (if involved): No    Discharge Planner Nurse   Safe discharge environment identified: Yes  Barriers to discharge: Yes       Entered by: Gela Johnson RN 08/05/2022 5:38 PM   Pt is alert and oriented x4. Pt is med complaint. Pt's v/s is stable. Pt is on 3 liter of O2 Nc. Pt's O2 on 2 liter went down to 88 percent. Unable to wean the pt off of O2 per order.pt denies pain continue to monitor pt.  Please review provider order for any additional goals.   Nurse to notify provider when observation goals have been met and patient is ready for discharge.Goal Outcome Evaluation:

## 2022-08-05 NOTE — PLAN OF CARE
PRIMARY DIAGNOSIS: ACUTE PAIN  OUTPATIENT/OBSERVATION GOALS TO BE MET BEFORE DISCHARGE:  1. Pain Status: Pain free.    2. Return to near baseline physical activity: Yes    3. Cleared for discharge by consultants (if involved): No    Discharge Planner Nurse   Safe discharge environment identified: Yes  Barriers to discharge: Yes       Entered by: Gela Johnson RN 08/05/2022 1:56 PM   Pt is alert and oriented x4. Pt is med complaint. Pt denies pain. Pt's v/s is stable. Pt is on 3 liter of O2 Nc. Pt's admission is done. No complain. Continue to monitor pt.  Please review provider order for any additional goals.   Nurse to notify provider when observation goals have been met and patient is ready for discharge.Goal Outcome Evaluation:

## 2022-08-05 NOTE — ED NOTES
"[facility] ED Handoff Report    ED Chief Complaint: AMS, tier 1 stroke code    ED Diagnosis:  (G45.9) TIA (transient ischemic attack)  Comment:   Plan:        PMH:  No past medical history on file.     Code Status:  Full Code     Falls Risk: No Band: Not applicable    Current Living Situation/Residence: lives with a significant other     Elimination Status: Continent: Yes     Activity Level: SBA    Patients Preferred Language:  English     Needed: No    Vital Signs:  BP (!) 146/74   Pulse 58   Temp 97.7  F (36.5  C) (Oral)   Resp 25   Ht 1.778 m (5' 10\")   Wt 74.8 kg (165 lb)   SpO2 96%   BMI 23.68 kg/m       Cardiac Rhythm:     Pain Score: 0/10    Is the Patient Confused:  No    Last Food or Drink: 08/05/22 at 0900    Focused Assessment:  Hx covid (positive at clinic 7/21). Pt brought to ED with AMS, tier 1 stroke code called. De-escalated 1750. Dx TIA, which pt states he has had before. Neuros intact, GCS 15. AOx4. SOB with activity. Pt on O2 NC, 1L when resting, 2L during ambulation. SBA to commode, continent. Denies pain. Currently on special precautions pending MD decision on quarantine.    Tests Performed: Done: Labs and Imaging    Treatments Provided:  PO ASA, Plavix.    Family Dynamics/Concerns: No    Family Updated On Visitor Policy: Yes    Plan of Care Communicated to Family: Yes    Who Was Updated about Plan of Care: wife updated by pt    Belongings Checklist Done and Signed by Patient: Yes    Medications sent with patient: n/a    Covid: asymptomatic , positive    Additional Information:     RN: Vlad Abraham RN 8/5/2022 9:53 AM         "

## 2022-08-05 NOTE — ED NOTES
"MD requested trial on RA. Pt O2 dropped tot 88 on RA after approx 20 minutes. Pt denies SOB but states \"but that machine says I am\". Placed on 2L NC, O2 increased to low 90s. Provider notified.  "

## 2022-08-05 NOTE — CONSULTS
NEUROLOGY INPATIENT CONSULTATION NOTE       [facility]  [address] [place] [address]  Tel: [phone]  Fax: [phone]  [email or web address]     Jeffy Olson,  1931, MRN [id number]  PCP: Zaki Ochoa  Date: 2022     ASSESSMENT & PLAN     Diagnosis code: TIA    Transient ischemic attack  89-year-old male with history of rheumatoid arthritis, HTN, BPH and basal cell carcinoma admitted with transient speech difficulty that resolved spontaneously.    Patient did not receive thrombolytics due to transient nature of symptoms    CT of the head and CTA shows no acute acute ischemia.  Diminutive appearance of the left vertebral artery    Check MRI brain    Echocardiogram    Patient loaded with Plavix and currently on dual antiplatelet therapy with baby aspirin and Plavix.  Continue for 90 days.  Afterwards switch to full-strength aspirin 325 mg daily    LDL is 69, continue simvastatin 20 mg daily    Telemetry monitoring and if no cardiac arrhythmias noted during current hospitalization schedule for outpatient 30-day event monitor    Physical, occupational therapy    Thank you again for this referral, please feel free to contact me if you have any questions.    Sai Peraza MD  [facility]  (Formerly, [facility]     CHIEF COMPLAINT <principal problem not specified>     HISTORY OF PRESENT ILLNESS     We have been requested by Dr. Gautam to evaluate eJffy Olson who is a 89 year old  male for TIA    Patient is a 89-year-old male with history of rheumatoid arthritis, HTN, BPH, TIA who presented to the emergency room for transient aphasia along with confusion.  On 2022 1-1/2-hour prior to arrival to the emergency room he had a bout of aphasia during which he was unable to explain how to start a car or what he was doing in the car.  He was also unable to recognize a close family friend.   Prior to that he was seen in the urgency room for shortness of breath and was found to be COVID-19 positive.  During yesterday's event patient went back inside his house and was noted to have difficulty expressing himself and also could not recognize his granddaughter.  He was brought to the emergency room and a stroke code was called.  CT of the head and CTA was done that did not show any acute ischemia or large vessel occlusion.  He has diminutive left vertebral artery.  His symptoms started clearing up by the time he reached the emergency room and was admitted for further management for TIA.  He was loaded with Plavix     PROBLEM LIST      Patient Active Problem List   Diagnosis Code     TIA (transient ischemic attack) G45.9     HTN (hypertension) I10     Accelerated hypertension I10     Hyperlipidemia E78.5     RA (rheumatoid arthritis) (H) M06.9      Clinically Significant Risk Factors Present on Admission                  # CKD, Stage 3a (GFR 45-59): Will monitor and treat as appropriate  - last Cr =  1.33 mg/dL (Ref range: 0.70 - 1.30 mg/dL)  - last GFR = 51 mL/min/1.73m2 (Ref range: >60 mL/min/1.73m2)           PAST MEDICAL & SURGICAL HISTORY     Past Medical History: Patient  has no past medical history on file.    Past Surgical History: He  has a past surgical history that includes Release carpal tunnel (Right); Cervical Fusion; Replacement Total Knee (Bilateral); hernia repair; Hemorrhoidectomy external; Cataract Extraction (Bilateral); and Hand surgery (Bilateral).     SOCIAL HISTORY     Reviewed, and he  reports that he quit smoking about 61 years ago. He does not have any smokeless tobacco history on file. He reports that he does not drink alcohol and does not use drugs.     FAMILY HISTORY     Reviewed, and family history is not on file.     ALLERGIES     No Known Allergies     REVIEW OF SYSTEMS     Pertinent items are noted in HPI.     HOME & HOSPITAL MEDICATIONS     Prior to Admission  Medications  (Not in a hospital admission)      Hospital Medications    amLODIPine  5 mg Oral Daily     atenolol  50 mg Oral Daily     hydrochlorothiazide  25 mg Oral Daily     multivitamin  with lutein  1 capsule Oral BID     predniSONE  4 mg Oral Daily     simvastatin  20 mg Oral At Bedtime     sodium chloride (PF)  3 mL Intracatheter Q8H     sodium chloride (PF)  3 mL Intracatheter Q8H        PHYSICAL EXAM     Vital signs  Temp:  [97.7  F (36.5  C)-99.6  F (37.6  C)] 97.7  F (36.5  C)  Pulse:  [] 53  Resp:  [16-40] 25  BP: (156-187)/(71-86) 156/71  SpO2:  [79 %-97 %] 96 %    Weight:   Wt Readings from Last 1 Encounters:   08/04/22 74.8 kg (165 lb)        General Physical Exam: Patient is alert and oriented x 3. Vital signs were reviewed and are documented in EMR. Neck was supple, no carotid bruit, thyromegaly, JVD or lymphadenopathy noted.  Neurological Exam:  Mental status: Patient is alert and oriented x3 no acute distress  Speech: Normal with no dysarthria or aphasia  Cranial nerves: He has left blepharospasm.  Rest of the cranial nerves are intact  Motor: Normal mass and tone with 5/5 strength  Reflexes: 1+ toes downgoing  Sensory: Intact light touch pinprick  Coordination: Bilateral dysmetria on finger-nose testing  Gait: Deferred for safety     DIAGNOSTIC STUDIES     Pertinent Radiology   Radiology Results: Reviewed impression and images     CT  NONCONTRAST HEAD CT:  1.  Unremarkable examination for age. No acute intracranial process.     HEAD CTA:   1.  No significant stenosis, aneurysm, or high flow vascular malformation identified.     NECK CTA:  1.  No significant carotid stenosis.  2.  Right dominant vertebral artery is patent. Diminutive appearance of the left vertebral artery with some areas not well visualized/opacified. This may be on a developmental basis or related to stenosis/chronic occlusion.    MRI  Pending    Pertinent Labs   Lab Results: Personally Reviewed   Recent Results (from the  past 24 hour(s))   Glucose by meter    Collection Time: 08/04/22  5:13 PM   Result Value Ref Range    GLUCOSE BY METER POCT 96 70 - 99 mg/dL   CTA Head Neck with Contrast    Collection Time: 08/04/22  5:38 PM   Result Value Ref Range    Radiologist flags Results from the CT noncontrast head (AA)    Basic metabolic panel    Collection Time: 08/04/22  5:39 PM   Result Value Ref Range    Sodium 133 (L) 136 - 145 mmol/L    Potassium 3.6 3.5 - 5.0 mmol/L    Chloride 95 (L) 98 - 107 mmol/L    Carbon Dioxide (CO2) 30 22 - 31 mmol/L    Anion Gap 8 5 - 18 mmol/L    Urea Nitrogen 20 8 - 28 mg/dL    Creatinine 1.33 (H) 0.70 - 1.30 mg/dL    Calcium 9.2 8.5 - 10.5 mg/dL    Glucose 92 70 - 125 mg/dL    GFR Estimate 51 (L) >60 mL/min/1.73m2   INR    Collection Time: 08/04/22  5:39 PM   Result Value Ref Range    INR 0.95 0.85 - 1.15   Partial thromboplastin time    Collection Time: 08/04/22  5:39 PM   Result Value Ref Range    aPTT 29 22 - 38 Seconds   Troponin I    Collection Time: 08/04/22  5:39 PM   Result Value Ref Range    Troponin I 0.01 0.00 - 0.29 ng/mL   CBC with platelets and differential    Collection Time: 08/04/22  5:39 PM   Result Value Ref Range    WBC Count 7.6 4.0 - 11.0 10e3/uL    RBC Count 5.11 4.40 - 5.90 10e6/uL    Hemoglobin 15.7 13.3 - 17.7 g/dL    Hematocrit 46.7 40.0 - 53.0 %    MCV 91 78 - 100 fL    MCH 30.7 26.5 - 33.0 pg    MCHC 33.6 31.5 - 36.5 g/dL    RDW 13.7 10.0 - 15.0 %    Platelet Count 228 150 - 450 10e3/uL   Extra Red Top Tube    Collection Time: 08/04/22  5:39 PM   Result Value Ref Range    Hold Specimen [facility]    Lipid panel reflex to direct LDL: Non-fasting    Collection Time: 08/04/22  5:39 PM   Result Value Ref Range    Cholesterol 150 <=199 mg/dL    Triglycerides 96 <=149 mg/dL    Direct Measure HDL 62 >=40 mg/dL    LDL Cholesterol Calculated 69 <=129 mg/dL   Hemoglobin A1c    Collection Time: 08/04/22  5:39 PM   Result Value Ref Range    Hemoglobin A1C 5.6 <=5.6 %   Manual Differential     Collection Time: 08/04/22  5:39 PM   Result Value Ref Range    % Neutrophils 44 %    % Lymphocytes 44 %    % Monocytes 10 %    % Eosinophils 0 %    % Basophils 2 %    Absolute Neutrophils 3.3 1.6 - 8.3 10e3/uL    Absolute Lymphocytes 3.3 0.8 - 5.3 10e3/uL    Absolute Monocytes 0.8 0.0 - 1.3 10e3/uL    Absolute Eosinophils 0.0 0.0 - 0.7 10e3/uL    Absolute Basophils 0.2 0.0 - 0.2 10e3/uL    RBC Morphology Confirmed RBC Indices     Platelet Assessment  Automated Count Confirmed. Platelet morphology is normal.     Automated Count Confirmed. Platelet morphology is normal.    Reactive Lymphocytes Present (A) None Seen   Asymptomatic COVID-19 Virus (Coronavirus) by PCR Nasopharyngeal    Collection Time: 08/04/22  5:57 PM    Specimen: Nasopharyngeal; Swab   Result Value Ref Range    SARS CoV2 PCR Positive (A) Negative       Total time spent for face to face visit, reviewing labs/imaging studies, counseling and coordination of care was: 1 Hour 15 Minutes More than 50% of this time was spent on counseling and coordination of care.      This note was dictated using voice recognition software.  Any grammatical or context distortions are unintentional and inherent to the software.

## 2022-08-06 ENCOUNTER — APPOINTMENT (OUTPATIENT)
Dept: OCCUPATIONAL THERAPY | Facility: HOSPITAL | Age: 87
DRG: 069 | End: 2022-08-06
Attending: INTERNAL MEDICINE
Payer: MEDICARE

## 2022-08-06 ENCOUNTER — APPOINTMENT (OUTPATIENT)
Dept: CT IMAGING | Facility: HOSPITAL | Age: 87
DRG: 069 | End: 2022-08-06
Attending: FAMILY MEDICINE
Payer: MEDICARE

## 2022-08-06 LAB
ANION GAP SERPL CALCULATED.3IONS-SCNC: 10 MMOL/L (ref 5–18)
BUN SERPL-MCNC: 22 MG/DL (ref 8–28)
CALCIUM SERPL-MCNC: 9 MG/DL (ref 8.5–10.5)
CHLORIDE BLD-SCNC: 94 MMOL/L (ref 98–107)
CO2 SERPL-SCNC: 29 MMOL/L (ref 22–31)
CREAT SERPL-MCNC: 1.47 MG/DL (ref 0.7–1.3)
D DIMER PPP FEU-MCNC: 0.99 UG/ML FEU (ref 0–0.5)
GFR SERPL CREATININE-BSD FRML MDRD: 45 ML/MIN/1.73M2
GLUCOSE BLD-MCNC: 127 MG/DL (ref 70–125)
GLUCOSE BLDC GLUCOMTR-MCNC: 101 MG/DL (ref 70–99)
GLUCOSE BLDC GLUCOMTR-MCNC: 159 MG/DL (ref 70–99)
GLUCOSE BLDC GLUCOMTR-MCNC: 94 MG/DL (ref 70–99)
HOLD SPECIMEN: NORMAL
MAGNESIUM SERPL-MCNC: 1.6 MG/DL (ref 1.8–2.6)
POTASSIUM BLD-SCNC: 3.4 MMOL/L (ref 3.5–5)
SODIUM SERPL-SCNC: 133 MMOL/L (ref 136–145)

## 2022-08-06 PROCEDURE — 83735 ASSAY OF MAGNESIUM: CPT | Performed by: FAMILY MEDICINE

## 2022-08-06 PROCEDURE — 84132 ASSAY OF SERUM POTASSIUM: CPT | Performed by: FAMILY MEDICINE

## 2022-08-06 PROCEDURE — 96372 THER/PROPH/DIAG INJ SC/IM: CPT | Performed by: FAMILY MEDICINE

## 2022-08-06 PROCEDURE — 80048 BASIC METABOLIC PNL TOTAL CA: CPT | Performed by: FAMILY MEDICINE

## 2022-08-06 PROCEDURE — 82962 GLUCOSE BLOOD TEST: CPT

## 2022-08-06 PROCEDURE — 96375 TX/PRO/DX INJ NEW DRUG ADDON: CPT

## 2022-08-06 PROCEDURE — 97535 SELF CARE MNGMENT TRAINING: CPT | Mod: GO

## 2022-08-06 PROCEDURE — 250N000012 HC RX MED GY IP 250 OP 636 PS 637: Performed by: INTERNAL MEDICINE

## 2022-08-06 PROCEDURE — G1010 CDSM STANSON: HCPCS

## 2022-08-06 PROCEDURE — G0378 HOSPITAL OBSERVATION PER HR: HCPCS

## 2022-08-06 PROCEDURE — 36415 COLL VENOUS BLD VENIPUNCTURE: CPT | Performed by: FAMILY MEDICINE

## 2022-08-06 PROCEDURE — 99233 SBSQ HOSP IP/OBS HIGH 50: CPT | Performed by: PSYCHIATRY & NEUROLOGY

## 2022-08-06 PROCEDURE — 250N000013 HC RX MED GY IP 250 OP 250 PS 637: Performed by: INTERNAL MEDICINE

## 2022-08-06 PROCEDURE — 99226 PR SUBSEQUENT OBSERVATION CARE,LEVEL III: CPT | Performed by: FAMILY MEDICINE

## 2022-08-06 PROCEDURE — 250N000013 HC RX MED GY IP 250 OP 250 PS 637: Performed by: FAMILY MEDICINE

## 2022-08-06 PROCEDURE — 250N000011 HC RX IP 250 OP 636: Performed by: FAMILY MEDICINE

## 2022-08-06 PROCEDURE — 97165 OT EVAL LOW COMPLEX 30 MIN: CPT | Mod: GO

## 2022-08-06 PROCEDURE — 85379 FIBRIN DEGRADATION QUANT: CPT | Performed by: FAMILY MEDICINE

## 2022-08-06 PROCEDURE — 250N000011 HC RX IP 250 OP 636: Performed by: INTERNAL MEDICINE

## 2022-08-06 RX ORDER — MAGNESIUM SULFATE HEPTAHYDRATE 40 MG/ML
2 INJECTION, SOLUTION INTRAVENOUS ONCE
Status: COMPLETED | OUTPATIENT
Start: 2022-08-06 | End: 2022-08-06

## 2022-08-06 RX ORDER — IOPAMIDOL 755 MG/ML
75 INJECTION, SOLUTION INTRAVASCULAR ONCE
Status: COMPLETED | OUTPATIENT
Start: 2022-08-06 | End: 2022-08-06

## 2022-08-06 RX ORDER — POTASSIUM CHLORIDE 1.5 G/1.58G
40 POWDER, FOR SOLUTION ORAL ONCE
Status: COMPLETED | OUTPATIENT
Start: 2022-08-06 | End: 2022-08-06

## 2022-08-06 RX ADMIN — POTASSIUM CHLORIDE 40 MEQ: 1.5 POWDER, FOR SOLUTION ORAL at 18:50

## 2022-08-06 RX ADMIN — ACETAMINOPHEN 650 MG: 325 TABLET ORAL at 09:36

## 2022-08-06 RX ADMIN — SIMVASTATIN 20 MG: 10 TABLET, FILM COATED ORAL at 23:40

## 2022-08-06 RX ADMIN — PREDNISONE 4 MG: 1 TABLET ORAL at 09:25

## 2022-08-06 RX ADMIN — ATENOLOL 50 MG: 25 TABLET ORAL at 09:23

## 2022-08-06 RX ADMIN — Medication 1 CAPSULE: at 20:21

## 2022-08-06 RX ADMIN — ASPIRIN 81 MG: 81 TABLET, COATED ORAL at 09:25

## 2022-08-06 RX ADMIN — ACETAMINOPHEN 650 MG: 325 TABLET ORAL at 16:10

## 2022-08-06 RX ADMIN — AMLODIPINE BESYLATE 5 MG: 5 TABLET ORAL at 09:22

## 2022-08-06 RX ADMIN — HEPARIN SODIUM 5000 UNITS: 10000 INJECTION, SOLUTION INTRAVENOUS; SUBCUTANEOUS at 09:24

## 2022-08-06 RX ADMIN — CLOPIDOGREL BISULFATE 75 MG: 75 TABLET ORAL at 09:22

## 2022-08-06 RX ADMIN — HEPARIN SODIUM 5000 UNITS: 10000 INJECTION, SOLUTION INTRAVENOUS; SUBCUTANEOUS at 20:21

## 2022-08-06 RX ADMIN — IOPAMIDOL 75 ML: 755 INJECTION, SOLUTION INTRAVENOUS at 15:44

## 2022-08-06 RX ADMIN — MAGNESIUM SULFATE IN WATER 2 G: 40 INJECTION, SOLUTION INTRAVENOUS at 13:10

## 2022-08-06 RX ADMIN — Medication 1 CAPSULE: at 09:26

## 2022-08-06 NOTE — PROGRESS NOTES
08/06/22 0955   Quick Adds:Certification   Type of Visit Initial Occupational Therapy Evaluation   Living Environment   People in Home spouse   Current Living Arrangements house   Home Accessibility stairs to enter home   Number of Stairs, Main Entrance 2   Stair Railings, Main Entrance railings safe and in good condition   Number of Stairs, Within Home, Primary greater than 10 stairs   Stair Railings, Within Home, Primary railings safe and in good condition   Transportation Anticipated family or friend will provide;car, drives self   Living Environment Comments bedroom om main level   Self-Care   Equipment Currently Used at Home none   Activity/Exercise/Self-Care Comment indep. all ADLs/IADLs   General Information   Onset of Illness/Injury or Date of Surgery 08/04/22, PMH: HTN, RA, possible TIA, COVID +   Patient/Family Therapy Goal Statement (OT) none stated   Existing Precautions/Restrictions oxygen therapy device and L/min   Cognitive Status Examination   Orientation Status person;place;time   Visual Perception   Visual Impairment/Limitations corrective lenses for reading   Range of Motion Comprehensive   General Range of Motion no range of motion deficits identified   Strength Comprehensive (MMT)   General Manual Muscle Testing (MMT) Assessment no strength deficits identified   Coordination   Upper Extremity Coordination No deficits were identified   Bed Mobility   Bed Mobility supine-sit;sit-supine   Supine-Sit Hill (Bed Mobility) independent   Sit-Supine Hill (Bed Mobility) independent   Transfers   Transfers shower transfer;toilet transfer;bed-chair transfer   Transfer Skill: Bed to Chair/Chair to Bed   Bed-Chair Hill (Transfers) supervision   Shower Transfer   Hill Level (Shower Transfer) contact guard   Assistive Device (Shower Transfer) grab bar, tub rail   Toilet Transfer   Type (Toilet Transfer) sit-stand;stand-sit   Hill Level (Toilet Transfer) supervision    Balance   Balance Assessment standing balance: dynamic   Balance Comments WFL   Lower Body Dressing Assessment/Training   [place] Level (Lower Body Dressing) independent   Comment, (Lower Body Dressing) to reach socks   Grooming Assessment/Training   [place] Level (Grooming) supervision   Comment, (Grooming) washed hands at sink, combed hair   Clinical Impression   Criteria for Skilled Therapeutic Interventions Met (OT) Yes, treatment indicated   OT Diagnosis decreased ADLs   OT Problem List-Impairments impacting ADL activity tolerance impaired   Assessment of Occupational Performance 1-3 Performance Deficits   Identified Performance Deficits activity tolerance for ADLs   Planned Therapy Interventions (OT)   (PLB education)   Clinical Decision Making Complexity (OT) low complexity   Anticipated Equipment Needs Upon Discharge (OT)   (NA)   OT Discharge Planning   OT Discharge Recommendation (DC Rec) home with assist   OT Rationale for DC Rec d/c home w/ assist as needed for ADLs and household tasks depending on activity tolerance   Total Evaluation Time (Minutes)   Total Evaluation Time (Minutes) 8   OT Goals: Certification:Start date: 8/6/22, Cert:From: 8/6/22,Cert. To: 8/13/22, Diagnosis: TIA   Therapy Frequency (OT) One time eval and treatment   OT Predicted Duration/Target Date for Goal Attainment 08/06/22   OT Goals OT Goal 1;Hygiene/Grooming;Transfers   OT: Hygiene/Grooming supervision/stand-by assist;while standing   OT: Transfer Supervision/stand-by assist  (no AD)   OT: Goal 1 Participate in PLB technique after demo keeping o2 sats equal to/greater than 90%  increase ADL endurance

## 2022-08-06 NOTE — PROGRESS NOTES
"PRIMARY DIAGNOSIS: \"GENERIC\" NURSING  OUTPATIENT/OBSERVATION GOALS TO BE MET BEFORE DISCHARGE:  1. ADLs back to baseline: Yes    2. Activity and level of assistance: Ambulating independently.    3. Pain status: Pain free.    4. Return to near baseline physical activity: No     Discharge Planner Nurse   Safe discharge environment identified: No  Barriers to discharge: No       Entered by: Gi Schuster RN 08/06/2022 6:50 AM     Please review provider order for any additional goals.   Nurse to notify provider when observation goals have been met and patient is ready for discharge.    Patient is alert ad oriented, NIH score 0, patient independent in the room, denies pain, PT is on 3L O2 Nc. Unable to wean off O2. VSS.  "

## 2022-08-06 NOTE — UTILIZATION REVIEW
Concurrent stay review; Secondary Review Determination     Under the authority of the Utilization Management Committee, the utilization review process indicated a secondary review on Jeffy Olson.  The review outcome is based on review of the medical records, discussions with staff, and applying clinical experience noted on the date of the review.        (x) Observation Status Appropriate - Concurrent stay review    RATIONALE FOR DETERMINATION   89 year old male with past medical history COVID on 7/21/2022, TIA, rheumatoid arthritis, BPH, hypertension, and basal cell skin cancer who was admitted on 8/4/2022 for possible TIA. He had an episode of memory loss with aphasia.  Patient also was found to be in asymptomatic hypoxic respiratory failure, negative work up for CVA, neurology consulted, still on 2 liters of O2 , pending CT chest , if CT is positive for acute changes or pneumonia that require IV antibiotics then will change to inpatient     Patient is clinically improving and there is no clear indication to change patient's status to inpatient. The severity of illness, intensity of service provided, expected LOS and risk for adverse outcome make the care appropriate for observation.    The information on this document is developed by the utilization review team in order for the business office to ensure compliance.  This only denotes the appropriateness of proper admission status and does not reflect the quality of care rendered.         The definitions of Inpatient Status and Observation Status used in making the determination above are those provided in the CMS Coverage Manual, Chapter 1 and Chapter 6, section 70.4.      Sincerely,   Marco Cantu MD  Utilization Review  Physician Advisor  [facility]

## 2022-08-06 NOTE — PHARMACY-CONSULT NOTE
Pharmacy Consult to evaluate for medication related stroke core measures    Jeffy LAZARO Olson, 89 year old male admitted for TIA on 8/4/2022.    Thrombolytic was not given because of Clinical contraindications    VTE Prophylaxis Heparin given on 8/5 as appropriate prior to end of hospital day 2.    Antithrombotic: aspirin and clopidogrel started on 8/4, as appropriate by end of hospital day 2. Continue antithrombotic therapy on discharge to meet quality measures, unless contraindicated.    Anticoagulation if history of A-fib/flutter: Patient does not have history of A-fib/flutter - anticoagulation not required for medication related stroke core measures.     LDL Cholesterol Calculated   Date Value Ref Range Status   08/04/2022 69 <=129 mg/dL Final       Patient currently receiving Zocor (simvastatin) continue statin on discharge to meet quality measures, unless contraindicated.    Recommendations: None at this time    Thank you for the consult.    Maria Teresa Benoit [facility] 8/6/2022 7:09 AM

## 2022-08-06 NOTE — PLAN OF CARE
[facility]      OUTPATIENT OCCUPATIONAL THERAPY  EVALUATION  PLAN OF TREATMENT FOR OUTPATIENT REHABILITATION  (COMPLETE FOR INITIAL CLAIMS ONLY)  Patient's Last Name, First Name, M.I.  YOB: 1931  Jeffy Olson                          Provider's Name  [facility] Medical Record No.  [id number]                               Onset Date:  08/04/22   Start of Care Date:  08/06/22     Type:     ___PT   _X_OT   ___SLP Medical Diagnosis:  TIA                        OT Diagnosis:  decreased ADLs   Visits from SOC:  1   _________________________________________________________________________________  Plan of Treatment/Functional Goals    Planned Interventions:  (PLB education)   Goals: See Occupational Therapy Goals on Care Plan in [employer] electronic health record.    Therapy Frequency: One time eval and treatment  Predicted Duration of Therapy Intervention: 08/06/22  _________________________________________________________________________________    I CERTIFY THE NEED FOR THESE SERVICES FURNISHED UNDER        THIS PLAN OF TREATMENT AND WHILE UNDER MY CARE     (Physician co-signature of this document indicates review and certification of the therapy plan).                Certification date from: 08/06/22, Certification date to: 08/13/22    Referring Physician: Dr. Summer Jauregui            Initial Assessment        See Occupational Therapy evaluation dated 08/06/22 in Epic electronic health record.              Goal Outcome Evaluation:

## 2022-08-06 NOTE — PROGRESS NOTES
[facility]    Medicine Progress Note - Hospitalist Service       Date of Admission:  8/4/2022  Active Problems:    TIA (transient ischemic attack)     Assessment & Plan            Jeffy Olson is a 89 year old male with past medical history significant for diagnosis of COVID on 7/21/2022, TIA, rheumatoid arthritis, BPH, hypertension, and basal cell skin cancer who was admitted on 8/4/2022 for possible TIA. He had an episode of memory loss with aphasia.  Patient also was found to be in asymptomatic hypoxic respiratory failure    TIA  -Stroke protocol in place  -Neurology consulted  -Loaded with Plavix 300 mg and started on 75 mg daily, simvastatin 20 mg at bedtime, aspirin  -MRI brain with and without contrast shows no acute infarct  Cardiac echocardiogram unremarkable  TSH, B12 unremarkable  -A1c 8/4: 5.6  -Lipid panel 8/4: LDL 69, HDL 62, triglycerides 96  -PT and OT ordered  -CT head 8/4: Negative for acute process, diminutive left vertebral artery that has been present since 2015  Monitor on telemetry    Acute hypoxic respiratory failure-  BNP mildly elevated, chest x-ray shows signs of pulmonary congestion but no acute infiltrate.  Had COVID over 2 weeks ago.  Cardiac echocardiogram shows normal ejection fraction, suspect acute diastolic CHF exacerbation versus underlying COPD versus post-COVID hypoxia  Give Lasix 40 mg on 8/5, hypoxia improved, now on 1 L instead of 3  D-dimer mildly elevated, CT shows no PE, some groundglass opacities at the base that appear dependent, read as possible infectious infiltrate but discussed with pulmonology, seems more like fluid  Procalcitonin negative, WBC negative, afebrile, had COVID with a mild cough, still have a slight cough that is improved, nonproductive  Continue aggressive I-S, hold on antibiotics, hold on further diuretic given increased creatinine and contrast.  Continue to wean O2, consider furosemide tomorrow     Essential  hypertension  -Stable.  Continue amlodipine, atenolol.  Hold hydrochlorothiazide.     Rheumatoid arthritis  -On Enbrel and prednisone.  Stable.     # Confirmed COVID-19 infection       Symptom Onset 07/19/2022   Date of 1st Positive Test  07/21/2022   Vaccination Status Fully Vaccinated         Asymptomatic except for very slight cough.  Original symptoms were cough and sore throat and malaise.  No chest pain or shortness of breath but is mildly hypoxic.   Outside the window for any medications.  However will have to check to see if he needs to be in precautions for 20 days instead of 10 days due to his rheumatoid arthritis and use of Enbrel and prednisone.    Chronic kidney disease stage III-Baseline creatinine approximately 1.2-1.4, increased to 1.5 today after diuretic.  Hold further diuretic today, received contrast as well, recheck tomorrow.  Avoid nephrotoxic medication.  Hold hydrochlorothiazide.    Hypokalemia, hypomagnesia-replace     Diet: Regular Diet Adult    DVT Prophylaxis: Heparin SQ  Teresa Catheter: Not present  Central Lines: None  Code Status: Full Code      Disposition Plan      Expected Discharge Date: 08/07/2022                The patient's care was discussed with the Bedside Nurse, Care Coordinator/ and Patient. for total time 35 minutes with greater than 50% of total time spent in counseling and coordination of care.    DARLENE TIRADO MD  Hospitalist Service  [facility]  Securely message with the Vocera Web Console (learn more here)  Text page via [employer] Paging/Directory        Clinically Significant Risk Factors Present on Admission                          ______________________________________________________________________    Interval History   Remainder of 12 point review of systems negative except as noted below    Subjective:  Patient feels more tired today.  No further episodes of difficulty word finding or confusion.  Denies chest pain or  "shortness of breath, leg pain, orthopnea.  Has minimal nonproductive cough that is improved since coming down with COVID over 2 weeks ago.  I      Original COVID symptoms were cough and sore throat and malaise, much improved.  Was diagnosed on 7/21 but had symptoms a few days earlier.  Is immunized and boosted    Data reviewed today: I reviewed all medications, new labs and imaging results over the last 24 hours.     Physical Exam   Vital Signs: Temp: 99.1  F (37.3  C) Temp src: Oral BP: (!) 146/68 Pulse: 64   Resp: 20 SpO2: 92 % O2 Device: None (Room air) Oxygen Delivery: 1 LPM  Weight: 156 lbs 0 oz  Physical Exam:  Temp:  [97.6  F (36.4  C)-99.1  F (37.3  C)] 99.1  F (37.3  C)  Pulse:  [55-79] 64  Resp:  [18-20] 20  BP: (111-146)/(61-68) 146/68  SpO2:  [90 %-95 %] 92 %    BP (!) 146/68 (BP Location: Left arm, Patient Position: Semi-Sheth's, Cuff Size: Adult Regular)   Pulse 64   Temp 99.1  F (37.3  C) (Oral)   Resp 20   Ht 1.778 m (5' 10\")   Wt 70.8 kg (156 lb)   SpO2 92%   BMI 22.38 kg/m    General appearance: alert, appears stated age and cooperative  Head: Normocephalic, without obvious abnormality, atraumatic  Eyes: Clear conjuctiva  Neck: no JVD and supple, symmetrical, trachea midline  Lungs: rales bibasilar  Heart: regular rate and rhythm, S1, S2 normal, no murmur, click, rub or gallop  Abdomen: soft, non-tender; bowel sounds normal; no masses,  no organomegaly  Extremities: Negative homans,   Skin: Skin color, texture, turgor normal. No rashes or lesions  Neurologic: Mental status: Alert, oriented, thought content appropriate  Cranial nerves: normal  Sensory: normal  Motor:grossly normal          Data   Recent Labs   Lab 08/06/22  1147 08/06/22  0842 08/06/22  0748 08/05/22  1157 08/05/22  1045 08/04/22  1739   WBC  --   --   --   --  6.1 7.6   HGB  --   --   --   --  15.0 15.7   MCV  --   --   --   --  92 91   PLT  --   --   --   --  205 228   INR  --   --   --   --   --  0.95   NA  --  133*  --   " --  133* 133*   POTASSIUM  --  3.4*  --   --  3.9 3.6   CHLORIDE  --  94*  --   --  96* 95*   CO2  --  29  --   --  30 30   BUN  --  22  --   --  17 20   CR  --  1.47*  --   --  1.26 1.33*   ANIONGAP  --  10  --   --  7 8   SELMA  --  9.0  --   --  8.9 9.2   * 127* 101*   < > 144* 92    < > = values in this interval not displayed.     Recent Results (from the past 24 hour(s))   CT Chest Pulmonary Embolism w Contrast    Narrative    EXAM: CT CHEST PULMONARY EMBOLISM W CONTRAST  LOCATION: [facility] [place]  DATE/TIME: 8/6/2022 3:42 PM    INDICATION: hypoxia  COMPARISON: None.  TECHNIQUE: CT chest pulmonary angiogram during arterial phase injection of IV contrast. Multiplanar reformats and MIP reconstructions were performed. Dose reduction techniques were used.   CONTRAST: [id number] 75ml    FINDINGS:  ANGIOGRAM CHEST: Pulmonary arteries are negative for pulmonary emboli. Thoracic aorta is negative for dissection. No CT evidence of right heart strain. The right main pulmonary artery measures 3.9 cm in size. The left main pulmonary artery measures 3.6   cm in size. This can be seen with pulmonary arterial hypertension.    LUNGS AND PLEURA: Mild emphysematous changes. No focal infiltrate, pleural effusion, or pneumothorax. Ground glass opacities identified in both lung bases, chronic versus acute.    MEDIASTINUM/AXILLAE: No mediastinal, hilar, or axillary lymphadenopathy.    CORONARY ARTERY CALCIFICATION: Mild.    UPPER ABDOMEN: Normal.    MUSCULOSKELETAL: Mild to moderate degenerative change.      Impression    IMPRESSION:  1.  No evidence of pulmonary embolism or aortic dissection.  2.  Dilatation of the right and left main pulmonary arteries which can be seen with pulmonary arterial hypertension.  3.  Mild emphysema.  4.  Ground opacities identified in both lung bases, chronic versus acute.

## 2022-08-06 NOTE — PROGRESS NOTES
This is a neurologic follow-up note      89-year-old admitted to hospital on 8/6/2022  Original neurologic consultation Dr. Sai Peraza      Chief complaint  Aphasia transient/TIA  Recent COVID illness        HPI  89-year-old presented to the hospital on 8/4/2022 with acute episode of aphasia.  Patient had difficulty explaining how to start a car or what he was doing in the car could not recognize a close friend.    Patient had been diagnosed with COVID 2 weeks ago and had some shortness of breath.    Evaluated by stroke team in the ER  Not treated with IV thrombolytics due to minor stroke resolving symptoms.        Past medical history  Hypertension  Hyperlipidemia  Rheumatoid arthritis  Cataract surgery bilaterally  Cervical fusion  Bilateral knee replacement  BPH  Basal cell carcinoma  COVID infection 7/22/2022      Habits  Past smoker quit 61 years ago  Does drink alcohol but not prior to this admission for about a week and a half.    Family history  Noncontributory            Work-up  CT scan head 8/4/2022  1.  Unremarkable examination for age. No acute intracranial process.  HEAD CTA: 8/4/2022  1.  No significant stenosis, aneurysm, or high flow vascular malformation identified.  NECK CTA: 4/20/2022  1.  No significant carotid stenosis.  2.  Right dominant vertebral artery is patent.        Diminutive appearance of the left vertebral artery with some areas not well visualized/opacified.       This may be on a developmental basis or related to stenosis/chronic occlusion.  MRI brain 8/5/2022  1.  No acute intracranial finding.   2.  No evidence for recent ischemia, intracranial hemorrhage, or mass.  B12 1591, TSH 0.7 (8/5/2022)  Troponin on admission, 0.01  Echo 8/5/2022, ejection fraction 55%, left atrium mildly enlarged see official report  HDL 62 LDL 69  Hemoglobin A1c 5.6%  COVID-19 + 8/4/2022      Labs  Sodium 134 potassium 4.0  BUN 24 creatinine 1.35  Glucose 95    White blood count 6.1, hemoglobin 15.0,  platelets 205,000          Exam  Blood pressure 139/70, pulse 62, temperature 98.1  Blood pressure range 111/61- 149/66  Pulse range 62-79  T-max 99.1        Review of systems  No headache no chest pain no abdominal pain  No nausea vomiting  No diplopia dysarthria dysphagia  No focal weakness  May be some mild fluctuating confusion versus aphasia subtle    Otherwise review systems negative    General exam per primary MD  Alert oriented x3  HEENT normal  Lungs clear  Heart rate regular  Abdomen soft  Symmetrical pulses  No edema feet    Neurologic exam  Alert orient x3  Normal prosody speech  Normal naming  Normal comprehension  Normal repetition  No aphasia bedside testing  No neglect  Memory recall fairly good    Cranials 2 through 12  No ophthalmoplegia  No nystagmus  Face symmetrical  Visual fields intact  Tongue twisters okay (at times may be a little bit of thickness of speech)  Question whether there is a little bit of blepharospasm    Upper extremities  No drift no tremor    Lower extremities  Lift off the bed bilaterally    Gait  Deferred            Assessment/plan    1.   TIA/aphasia/confusion      2.  Vascular risk factors       Hypertension/hyperlipidemia/age/distant past smoker    3.  COVID 19 infection exacerbating the above    Diagnosis  TIA  COVID infection      Treatment  Dual antiplatelet medication for 90 days then full dose aspirin    Plavix 75 mg once per day  Aspirin 81 mg once per day  Simvastatin 20 mg once per day    Outpatient 30-day event monitor    Risk factor reduction per primary MD    Disposition per primary MD  Could follow-up with neurology in 8 weeks    35 minutes total care time today greater than 50% face-to-face patient care team discussing and evaluating the above.

## 2022-08-06 NOTE — PLAN OF CARE
Occupational Therapy Discharge Summary    Reason for therapy discharge:    discharge from OT    Progress towards therapy goal(s). See goals on Care Plan in [facility] electronic health record for goal details.  Goals met    Therapy recommendation(s):    No further therapy is recommended.    Goal Outcome Evaluation:                 Mary Reyes, OTR/L  8/6/2022

## 2022-08-07 LAB
ANION GAP SERPL CALCULATED.3IONS-SCNC: 9 MMOL/L (ref 5–18)
BUN SERPL-MCNC: 24 MG/DL (ref 8–28)
CALCIUM SERPL-MCNC: 9.1 MG/DL (ref 8.5–10.5)
CHLORIDE BLD-SCNC: 97 MMOL/L (ref 98–107)
CO2 SERPL-SCNC: 28 MMOL/L (ref 22–31)
CREAT SERPL-MCNC: 1.35 MG/DL (ref 0.7–1.3)
GFR SERPL CREATININE-BSD FRML MDRD: 50 ML/MIN/1.73M2
GLUCOSE BLD-MCNC: 95 MG/DL (ref 70–125)
HOLD SPECIMEN: NORMAL
HOLD SPECIMEN: NORMAL
MAGNESIUM SERPL-MCNC: 2.2 MG/DL (ref 1.8–2.6)
POTASSIUM BLD-SCNC: 3.9 MMOL/L (ref 3.5–5)
POTASSIUM BLD-SCNC: 4 MMOL/L (ref 3.5–5)
SODIUM SERPL-SCNC: 134 MMOL/L (ref 136–145)

## 2022-08-07 PROCEDURE — 36415 COLL VENOUS BLD VENIPUNCTURE: CPT | Performed by: FAMILY MEDICINE

## 2022-08-07 PROCEDURE — 94799 UNLISTED PULMONARY SVC/PX: CPT

## 2022-08-07 PROCEDURE — 250N000009 HC RX 250: Performed by: FAMILY MEDICINE

## 2022-08-07 PROCEDURE — 96376 TX/PRO/DX INJ SAME DRUG ADON: CPT

## 2022-08-07 PROCEDURE — G0378 HOSPITAL OBSERVATION PER HR: HCPCS

## 2022-08-07 PROCEDURE — 250N000013 HC RX MED GY IP 250 OP 250 PS 637: Performed by: FAMILY MEDICINE

## 2022-08-07 PROCEDURE — 96372 THER/PROPH/DIAG INJ SC/IM: CPT | Performed by: FAMILY MEDICINE

## 2022-08-07 PROCEDURE — 250N000011 HC RX IP 250 OP 636: Performed by: FAMILY MEDICINE

## 2022-08-07 PROCEDURE — 80048 BASIC METABOLIC PNL TOTAL CA: CPT | Performed by: FAMILY MEDICINE

## 2022-08-07 PROCEDURE — 999N000157 HC STATISTIC RCP TIME EA 10 MIN

## 2022-08-07 PROCEDURE — 250N000012 HC RX MED GY IP 250 OP 636 PS 637: Performed by: INTERNAL MEDICINE

## 2022-08-07 PROCEDURE — 120N000001 HC R&B MED SURG/OB

## 2022-08-07 PROCEDURE — 99233 SBSQ HOSP IP/OBS HIGH 50: CPT | Performed by: FAMILY MEDICINE

## 2022-08-07 PROCEDURE — 83735 ASSAY OF MAGNESIUM: CPT | Performed by: FAMILY MEDICINE

## 2022-08-07 PROCEDURE — 250N000013 HC RX MED GY IP 250 OP 250 PS 637: Performed by: INTERNAL MEDICINE

## 2022-08-07 RX ORDER — OXYMETAZOLINE HYDROCHLORIDE 0.05 G/100ML
2 SPRAY NASAL ONCE
Status: COMPLETED | OUTPATIENT
Start: 2022-08-07 | End: 2022-08-07

## 2022-08-07 RX ORDER — FUROSEMIDE 10 MG/ML
40 INJECTION INTRAMUSCULAR; INTRAVENOUS ONCE
Status: DISCONTINUED | OUTPATIENT
Start: 2022-08-07 | End: 2022-08-07

## 2022-08-07 RX ORDER — FUROSEMIDE 10 MG/ML
20 INJECTION INTRAMUSCULAR; INTRAVENOUS EVERY 12 HOURS
Status: DISCONTINUED | OUTPATIENT
Start: 2022-08-07 | End: 2022-08-08 | Stop reason: HOSPADM

## 2022-08-07 RX ADMIN — Medication 1 CAPSULE: at 19:02

## 2022-08-07 RX ADMIN — ASPIRIN 81 MG: 81 TABLET, COATED ORAL at 09:26

## 2022-08-07 RX ADMIN — HEPARIN SODIUM 5000 UNITS: 10000 INJECTION, SOLUTION INTRAVENOUS; SUBCUTANEOUS at 09:28

## 2022-08-07 RX ADMIN — ATENOLOL 50 MG: 25 TABLET ORAL at 09:26

## 2022-08-07 RX ADMIN — CLOPIDOGREL BISULFATE 75 MG: 75 TABLET ORAL at 09:25

## 2022-08-07 RX ADMIN — PREDNISONE 4 MG: 1 TABLET ORAL at 09:26

## 2022-08-07 RX ADMIN — FUROSEMIDE 20 MG: 10 INJECTION, SOLUTION INTRAMUSCULAR; INTRAVENOUS at 21:25

## 2022-08-07 RX ADMIN — OXYMETAZOLINE HYDROCHLORIDE 2 SPRAY: 0.05 SPRAY NASAL at 16:31

## 2022-08-07 RX ADMIN — Medication 1 CAPSULE: at 09:27

## 2022-08-07 RX ADMIN — SIMVASTATIN 20 MG: 10 TABLET, FILM COATED ORAL at 21:25

## 2022-08-07 RX ADMIN — AMLODIPINE BESYLATE 5 MG: 5 TABLET ORAL at 09:26

## 2022-08-07 RX ADMIN — FUROSEMIDE 20 MG: 10 INJECTION, SOLUTION INTRAMUSCULAR; INTRAVENOUS at 09:31

## 2022-08-07 ASSESSMENT — ACTIVITIES OF DAILY LIVING (ADL)
ADLS_ACUITY_SCORE: 31

## 2022-08-07 NOTE — PLAN OF CARE
Goal Outcome Evaluation:      Problem: Plan of Care - These are the overarching goals to be used throughout the patient stay.    Goal: Absence of Hospital-Acquired Illness or Injury  Intervention: Identify and Manage Fall Risk  Recent Flowsheet Documentation  Taken 8/6/2022 1800 by Rian Monaco RN  Safety Promotion/Fall Prevention:   nonskid shoes/slippers when out of bed   patient and family education  Taken 8/6/2022 1215 by Rian Monaco RN  Safety Promotion/Fall Prevention:   nonskid shoes/slippers when out of bed   patient and family education  Intervention: Prevent Skin Injury  Recent Flowsheet Documentation  Taken 8/6/2022 1800 by Rian Monaco RN  Body Position: position changed independently  Taken 8/6/2022 1215 by Rian Monaco RN  Body Position: position changed independently  Intervention: Prevent and Manage VTE (Venous Thromboembolism) Risk  Recent Flowsheet Documentation  Taken 8/6/2022 1800 by Rian Monaco RN  Activity Management: activity adjusted per tolerance  Taken 8/6/2022 1215 by Rian Monaco RN  Activity Management: activity adjusted per tolerance   Prn tylenol was given x 2 for shoulder pain with some relief noted.   Pt still needing o2 nc, on 2 liters. Encourage use of IS.

## 2022-08-07 NOTE — PLAN OF CARE
Problem: Plan of Care - These are the overarching goals to be used throughout the patient stay.    Goal: Absence of Hospital-Acquired Illness or Injury  Intervention: Identify and Manage Fall Risk  Recent Flowsheet Documentation  Taken 8/7/2022 0015 by Gi Tee, RN  Safety Promotion/Fall Prevention:   lighting adjusted   nonskid shoes/slippers when out of bed   patient and family education  Taken 8/6/2022 2022 by Gi Tee, RN  Safety Promotion/Fall Prevention:   lighting adjusted   nonskid shoes/slippers when out of bed   patient and family education     Problem: Plan of Care - These are the overarching goals to be used throughout the patient stay.    Goal: Plan of Care Review/Shift Note  Description: The Plan of Care Review/Shift note should be completed every shift.  The Outcome Evaluation is a brief statement about your assessment that the patient is improving, declining, or no change.  This information will be displayed automatically on your shift note.  Outcome: Ongoing, Progressing   Goal Outcome Evaluation:      Patient is alert and oriented, NIH score 0, denies pain. VSS. Tele Sinus Jorge no other symptoms. Independent in the room.   Patient Sp02 down to 87-88 % on 2 L of Oxygen. Oxygen is back to 3 L.

## 2022-08-07 NOTE — UTILIZATION REVIEW
Admission Status; Secondary Review Determination   Under the authority of the Utilization Management Committee, the utilization review process indicated a secondary review on Jeffy Olson. The review outcome is based on review of the medical records, discussions with staff, and applying clinical experience noted on the date of the review.   (x) Inpatient Status Appropriate - This patient's medical care is consistent with medical management for inpatient care and reasonable inpatient medical practice.     RATIONALE FOR DETERMINATION   89 year old male with past medical history COVID on 7/21/2022, TIA, rheumatoid arthritis, BPH, hypertension, and basal cell skin cancer who was admitted on 8/4/2022 for possible TIA. He had an episode of memory loss with aphasia.  Patient also was found to be in asymptomatic hypoxic respiratory failure, negative work up for CVA, neurology consulted, still on 3 liters of O2.  Needing IV lasix now.  Not medically stable and crossing 4th night.  At the time of admission with the information available to the attending physician more than 2 nights Hospital complex care was anticipated, based on patient risk of adverse outcome if treated as outpatient and complex care required. Inpatient admission is appropriate based on the Medicare guidelines.   The information on this document is developed by the utilization review team in order for the business office to ensure compliance. This only denotes the appropriateness of proper admission status and does not reflect the quality of care rendered.   The definitions of Inpatient Status and Observation Status used in making the determination above are those provided in the CMS Coverage Manual, Chapter 1 and Chapter 6, section 70.4.   Sincerely,   Rosibel Esteves MD  Utilization Review  Physician Advisor  [facility]

## 2022-08-07 NOTE — PROGRESS NOTES
This is a neurologic follow-up note      89-year-old admitted to hospital on 8/6/2022  Original neurologic consultation Dr. Sai Peraza      Chief complaint  Aphasia transient/TIA  Recent COVID illness        HPI  89-year-old presented to the hospital on 8/4/2022 with acute episode of aphasia.  Patient had difficulty explaining how to start a car or what he was doing in the car could not recognize a close friend.    Patient had been diagnosed with COVID 2 weeks ago and had some shortness of breath.    Evaluated by stroke team in the ER  Not treated with IV thrombolytics due to minor stroke resolving symptoms.        Past medical history  Hypertension  Hyperlipidemia  Rheumatoid arthritis  Cataract surgery bilaterally  Cervical fusion  Bilateral knee replacement  BPH  Basal cell carcinoma  COVID infection 7/22/2022      Habits  Past smoker quit 61 years ago  Does drink alcohol but not prior to this admission for about a week and a half.    Family history  Noncontributory            Work-up  CT scan head 8/4/2022  1.  Unremarkable examination for age. No acute intracranial process.  HEAD CTA: 8/4/2022  1.  No significant stenosis, aneurysm, or high flow vascular malformation identified.  NECK CTA: 4/20/2022  1.  No significant carotid stenosis.  2.  Right dominant vertebral artery is patent.        Diminutive appearance of the left vertebral artery with some areas not well visualized/opacified.       This may be on a developmental basis or related to stenosis/chronic occlusion.  MRI brain 8/5/2022  1.  No acute intracranial finding.   2.  No evidence for recent ischemia, intracranial hemorrhage, or mass.  B12 1591, TSH 0.7 (8/5/2022)  Troponin on admission, 0.01  Echo 8/5/2022, ejection fraction 55%, left atrium mildly enlarged see official report  HDL 62 LDL 69  Hemoglobin A1c 5.6%  COVID-19 + 8/4/2022      Labs  Sodium 134 potassium 4.0  BUN 24 creatinine 1.35  Glucose 95    White blood count 6.1, hemoglobin 15.0,  platelets 205,000          Exam  Blood pressure 139/70, pulse 62, temperature 98.1  Blood pressure range 111/61- 149/66  Pulse range 62-79  T-max 99.1        Review of systems  No headache no chest pain no abdominal pain  No nausea vomiting  No diplopia dysarthria dysphagia  No focal weakness  May be some mild fluctuating confusion versus aphasia subtle less evident today  Although no shortness of breath with O2 monitor going off.    Otherwise review systems negative    General exam per primary MD  Alert oriented x3  HEENT normal  Lungs clear  Heart rate regular  Abdomen soft  Symmetrical pulses  No edema feet    Neurologic exam  Alert orient x3  Normal prosody speech  Normal naming  Normal comprehension  Normal repetition  No aphasia bedside testing  No neglect  Memory recall fairly good    Cranials 2 through 12  No ophthalmoplegia  No nystagmus  Face symmetrical  Visual fields intact  Tongue twisters okay (at times may be a little bit of thickness of speech)  Question whether there is a little bit of blepharospasm    Upper extremities  No drift no tremor    Lower extremities  Lift off the bed bilaterally    Gait  Deferred            Assessment/plan    1.   TIA/aphasia/confusion      2.  Vascular risk factors       Hypertension/hyperlipidemia/age/distant past smoker    3.  COVID 19 infection exacerbating the above    Diagnosis  TIA  COVID infection      Treatment  Dual antiplatelet medication for 90 days then full dose aspirin    Plavix 75 mg once per day  Aspirin 81 mg once per day  Simvastatin 20 mg once per day    Outpatient 30-day event monitor    Risk factor reduction per primary MD    Disposition per primary MD  Could follow-up with neurology in 12 weeks    Discussed with primary MD face-to-face      Neurology will sign off at this time    25 minutes total care time today greater than 50% face-to-face patient care team discussing and evaluating the above.

## 2022-08-08 VITALS
WEIGHT: 156 LBS | HEART RATE: 65 BPM | SYSTOLIC BLOOD PRESSURE: 142 MMHG | TEMPERATURE: 97.6 F | HEIGHT: 70 IN | RESPIRATION RATE: 18 BRPM | DIASTOLIC BLOOD PRESSURE: 61 MMHG | BODY MASS INDEX: 22.33 KG/M2 | OXYGEN SATURATION: 94 %

## 2022-08-08 LAB
ANION GAP SERPL CALCULATED.3IONS-SCNC: 10 MMOL/L (ref 5–18)
BUN SERPL-MCNC: 27 MG/DL (ref 8–28)
CALCIUM SERPL-MCNC: 9.1 MG/DL (ref 8.5–10.5)
CHLORIDE BLD-SCNC: 98 MMOL/L (ref 98–107)
CO2 SERPL-SCNC: 25 MMOL/L (ref 22–31)
CREAT SERPL-MCNC: 1.27 MG/DL (ref 0.7–1.3)
GFR SERPL CREATININE-BSD FRML MDRD: 54 ML/MIN/1.73M2
GLUCOSE BLD-MCNC: 92 MG/DL (ref 70–125)
GLUCOSE BLDC GLUCOMTR-MCNC: 104 MG/DL (ref 70–99)
MAGNESIUM SERPL-MCNC: 2 MG/DL (ref 1.8–2.6)
POTASSIUM BLD-SCNC: 3.9 MMOL/L (ref 3.5–5)
SODIUM SERPL-SCNC: 133 MMOL/L (ref 136–145)

## 2022-08-08 PROCEDURE — 94799 UNLISTED PULMONARY SVC/PX: CPT

## 2022-08-08 PROCEDURE — 36415 COLL VENOUS BLD VENIPUNCTURE: CPT | Performed by: FAMILY MEDICINE

## 2022-08-08 PROCEDURE — 999N000156 HC STATISTIC RCP CONSULT EA 30 MIN

## 2022-08-08 PROCEDURE — 250N000012 HC RX MED GY IP 250 OP 636 PS 637: Performed by: INTERNAL MEDICINE

## 2022-08-08 PROCEDURE — 250N000013 HC RX MED GY IP 250 OP 250 PS 637: Performed by: FAMILY MEDICINE

## 2022-08-08 PROCEDURE — 80048 BASIC METABOLIC PNL TOTAL CA: CPT | Performed by: FAMILY MEDICINE

## 2022-08-08 PROCEDURE — 99239 HOSP IP/OBS DSCHRG MGMT >30: CPT | Performed by: FAMILY MEDICINE

## 2022-08-08 PROCEDURE — 83735 ASSAY OF MAGNESIUM: CPT | Performed by: FAMILY MEDICINE

## 2022-08-08 PROCEDURE — 999N000157 HC STATISTIC RCP TIME EA 10 MIN

## 2022-08-08 PROCEDURE — 250N000013 HC RX MED GY IP 250 OP 250 PS 637: Performed by: INTERNAL MEDICINE

## 2022-08-08 RX ORDER — CLOPIDOGREL BISULFATE 75 MG/1
75 TABLET ORAL DAILY
Qty: 30 TABLET | Refills: 0 | Status: SHIPPED | OUTPATIENT
Start: 2022-08-09

## 2022-08-08 RX ORDER — FUROSEMIDE 20 MG
20 TABLET ORAL DAILY
Qty: 30 TABLET | Refills: 0 | Status: SHIPPED | OUTPATIENT
Start: 2022-08-08

## 2022-08-08 RX ADMIN — ASPIRIN 81 MG: 81 TABLET, COATED ORAL at 08:23

## 2022-08-08 RX ADMIN — CLOPIDOGREL BISULFATE 75 MG: 75 TABLET ORAL at 08:23

## 2022-08-08 RX ADMIN — AMLODIPINE BESYLATE 5 MG: 5 TABLET ORAL at 08:23

## 2022-08-08 RX ADMIN — ATENOLOL 50 MG: 25 TABLET ORAL at 08:23

## 2022-08-08 RX ADMIN — Medication 1 CAPSULE: at 08:24

## 2022-08-08 RX ADMIN — PREDNISONE 4 MG: 1 TABLET ORAL at 08:23

## 2022-08-08 ASSESSMENT — ACTIVITIES OF DAILY LIVING (ADL)
ADLS_ACUITY_SCORE: 31

## 2022-08-08 NOTE — PROGRESS NOTES
Patient has been assessed for Home Oxygen needs.     Pulse oximetry (SpO2) and Oxygen flow readings:    SpO2 = 96% on room air at rest while awake.    SpO2 improved to  % on   liters/minute at rest.    SpO2 = 93% on room air during activity/with exercise.    *SpO2 improved to  % on   liters/minute during activity/with exercise.      Maryellen Pearson RN

## 2022-08-08 NOTE — PROGRESS NOTES
RCAT Treatment Plan    Patient Score: 5  Patient Acuity: 5    Clinical Indication for Therapy: atelectasis    Therapy Ordered: Flutter valve QID    Assessment Summary: Change to PRN. PT is able to perform therapy well independently.     Shaheen Jessica, RT  8/8/2022

## 2022-08-08 NOTE — PLAN OF CARE
Goal Outcome Evaluation:      Patient alert and oriented, denies pain. 1 L oxymask, sat 95%. Independent in room, call light within reach. Special precaution maintained. NIH score 0.

## 2022-08-08 NOTE — PROGRESS NOTES
Patient on 1L oxymask. Flutter valve was instructed and patient shows great understanding in use of device. Needs one more follow up and then can be done by himself.

## 2022-08-08 NOTE — PLAN OF CARE
Patient tolerating diet. Denies pain. 02 sats at 87-89% on RA at beginning of shift, 02 1L applied with oxymask and now 94-95%. Independent in room. Afrin applied per orders, no nosebleeds this shift. NIH scoring 0.     Mildred Kern RN

## 2022-08-08 NOTE — PROGRESS NOTES
[facility]    Medicine Progress Note - Hospitalist Service       Date of Admission:  8/4/2022  Active Problems:    TIA (transient ischemic attack)     Assessment & Plan            Jeffy Olson is a 89 year old male with past medical history significant for diagnosis of COVID on 7/21/2022, TIA, rheumatoid arthritis, BPH, hypertension, and basal cell skin cancer who was admitted on 8/4/2022 for possible TIA. He had an episode of memory loss with aphasia.  Patient also was found to be in asymptomatic hypoxic respiratory failure secondary to Distolic CHF    TIA  -Stroke protocol in place  -Neurology consulted  -Loaded with Plavix 300 mg and started on 75 mg daily, simvastatin 20 mg at bedtime, aspirin  -MRI brain with and without contrast shows no acute infarct  Cardiac echocardiogram unremarkable  TSH, B12 unremarkable  -A1c 8/4: 5.6  -Lipid panel 8/4: LDL 69, HDL 62, triglycerides 96  -PT and OT ordered  -CT head 8/4: Negative for acute process, diminutive left vertebral artery that has been present since 2015  No arrythmias    Acute hypoxic respiratory failure-  BNP mildly elevated, chest x-ray shows signs of pulmonary congestion but no acute infiltrate.  Had COVID over 2 weeks ago.  Cardiac echocardiogram shows normal ejection fraction, suspect acute diastolic CHF exacerbation versus underlying COPD versus post-COVID hypoxia  Give Lasix 40 mg on 8/5, hypoxia improved, now on 1 L instead of 3  D-dimer mildly elevated, CT shows no PE, some groundglass opacities at the base that appear dependent, discussed with pulmonology, seems more like fluid  Procalcitonin negative, WBC negative, afebrile, had COVID with a mild cough, still have a slight cough that is improved, nonproductive  Continue aggressive I-S, hold on antibiotics,   Restart lasix, wean )2     Essential hypertension  -Stable.  Continue amlodipine, atenolol.  Hold hydrochlorothiazide.     Rheumatoid arthritis  -On Enbrel and  prednisone.  Stable.     # Confirmed COVID-19 infection       Symptom Onset 07/19/2022   Date of 1st Positive Test  07/21/2022   Vaccination Status Fully Vaccinated         Asymptomatic except for very slight cough.  Original symptoms were cough and sore throat and malaise.  No chest pain or shortness of breath but is mildly hypoxic.   Outside the window for any medications.  However will have to check to see if he needs to be in precautions for 20 days instead of 10 days due to his rheumatoid arthritis and use of Enbrel and prednisone.    Chronic kidney disease stage III-Baseline creatinine approximately 1.2-1.4, increased to 1.5 , now improved after holding lasix  Resratr lasixafter diuretic.    Avoid nephrotoxic medication.  Hold hydrochlorothiazide.    Hypokalemia, hypomagnesia-replace     Diet: Regular Diet Adult    DVT Prophylaxis: Heparin SQ  Teresa Catheter: Not present  Central Lines: None  Code Status: Full Code      Disposition Plan      Expected Discharge Date: 08/08/2022                The patient's care was discussed with the Bedside Nurse, Care Coordinator/ and Patient. for total time 35 minutes with greater than 50% of total time spent in counseling and coordination of care.    DARLENE TIRADO MD  Hospitalist Service  [facility]  Securely message with the Vocera Web Console (learn more here)  Text page via [employer] Paging/Directory        Clinically Significant Risk Factors Present on Admission                          ______________________________________________________________________    Interval History   Remainder of 12 point review of systems negative except as noted below    Subjective:  Patient feels more tired today.  No further episodes of difficulty word finding or confusion.  Denies chest pain or shortness of breath, leg pain, orthopnea.  Has minimal nonproductive cough that is improved since coming down with COVID over 2 weeks ago.  I      Original COVID  "symptoms were cough and sore throat and malaise, much improved.  Was diagnosed on 7/21 but had symptoms a few days earlier.  Is immunized and boosted    Data reviewed today: I reviewed all medications, new labs and imaging results over the last 24 hours.     Physical Exam   Vital Signs: Temp: 97.8  F (36.6  C) Temp src: Oral BP: 111/61 Pulse: 61   Resp: 16 SpO2: 90 % O2 Device: None (Room air) Oxygen Delivery: 3 LPM  Weight: 156 lbs 0 oz  Physical Exam:  Temp:  [97.6  F (36.4  C)-98.1  F (36.7  C)] 97.8  F (36.6  C)  Pulse:  [61-67] 61  Resp:  [16-20] 16  BP: (111-149)/(59-70) 111/61  SpO2:  [88 %-95 %] 90 %    /61 (BP Location: Left arm)   Pulse 61   Temp 97.8  F (36.6  C) (Oral)   Resp 16   Ht 1.778 m (5' 10\")   Wt 70.8 kg (156 lb)   SpO2 90%   BMI 22.38 kg/m    General appearance: alert, appears stated age and cooperative  Head: Normocephalic, without obvious abnormality, atraumatic  Eyes: Clear conjuctiva  Neck: no JVD and supple, symmetrical, trachea midline  Lungs: rales bibasilar  Heart: regular rate and rhythm, S1, S2 normal, no murmur, click, rub or gallop  Abdomen: soft, non-tender; bowel sounds normal; no masses,  no organomegaly  Extremities: Negative homans,   Skin: Skin color, texture, turgor normal. No rashes or lesions  Neurologic: Mental status: Alert, oriented, thought content appropriate  Cranial nerves: normal  Sensory: normal  Motor:grossly normal          Data   Recent Labs   Lab 08/07/22  0822 08/06/22  2344 08/06/22  2134 08/06/22  1147 08/06/22  0842 08/05/22  1157 08/05/22  1045 08/04/22  1739   WBC  --   --   --   --   --   --  6.1 7.6   HGB  --   --   --   --   --   --  15.0 15.7   MCV  --   --   --   --   --   --  92 91   PLT  --   --   --   --   --   --  205 228   INR  --   --   --   --   --   --   --  0.95   *  --   --   --  133*  --  133* 133*   POTASSIUM 4.0 3.9  --   --  3.4*  --  3.9 3.6   CHLORIDE 97*  --   --   --  94*  --  96* 95*   CO2 28  --   --   --  29  " --  30 30   BUN 24  --   --   --  22  --  17 20   CR 1.35*  --   --   --  1.47*  --  1.26 1.33*   ANIONGAP 9  --   --   --  10  --  7 8   SELMA 9.1  --   --   --  9.0  --  8.9 9.2   GLC 95  --  94 159* 127*   < > 144* 92    < > = values in this interval not displayed.     No results found for this or any previous visit (from the past 24 hour(s)).

## 2022-08-08 NOTE — PROGRESS NOTES
Home oxygen evaluation done, no need for home oxygen. Discharge paperwork reviewed with patient and prescriptions given. Heart failure folder given, along with information on Covid and TIA. Pt. Ambulated to front door with wife and grand daughter picking him up.     Maryellen Paerson RN

## 2022-08-09 ENCOUNTER — PATIENT OUTREACH (OUTPATIENT)
Dept: CARE COORDINATION | Facility: CLINIC | Age: 87
End: 2022-08-09

## 2022-08-09 DIAGNOSIS — Z71.89 OTHER SPECIFIED COUNSELING: ICD-10-CM

## 2022-08-09 PROBLEM — J96.01 ACUTE RESPIRATORY FAILURE WITH HYPOXIA (H): Status: ACTIVE | Noted: 2022-08-09

## 2022-08-09 PROBLEM — I50.31 ACUTE DIASTOLIC CONGESTIVE HEART FAILURE (H): Status: ACTIVE | Noted: 2022-08-09

## 2022-08-09 PROBLEM — N18.9 ACUTE KIDNEY INJURY SUPERIMPOSED ON CHRONIC KIDNEY DISEASE (H): Status: ACTIVE | Noted: 2022-08-09

## 2022-08-09 PROBLEM — N17.9 ACUTE KIDNEY INJURY SUPERIMPOSED ON CHRONIC KIDNEY DISEASE (H): Status: ACTIVE | Noted: 2022-08-09

## 2022-08-09 NOTE — DISCHARGE SUMMARY
[facility]  Hospitalist Discharge Summary      Date of Admission:  8/4/2022  Date of Discharge:  8/8/2022 12:28 PM  Discharging Provider: ZAKI TIRADO MD      Discharge Diagnoses   Principal Problem:    TIA (transient ischemic attack)  Active Problems:    HTN (hypertension)    RA (rheumatoid arthritis) (H)    Acute kidney injury superimposed on chronic kidney disease (H)    Acute diastolic congestive heart failure (H)    Acute respiratory failure with hypoxia (H)       Discharge Procedure Orders   Follow-Up with Cardiology [facility] Heart Failure Discharge   Standing Status: Future   Referral Priority: Routine: Next available opening Referral Type: Consultation   Requested Specialty: Cardiovascular Disease   Number of Visits Requested: 1     COVID-19 GetWell Loop Referral   Referral Priority: Routine: Next available opening Referral Type: Consultation   Number of Visits Requested: 1     Reason for your hospital stay   Order Comments: TIA, CHF     Follow-up and recommended labs and tests   Order Comments: Follow up with primary care provider, Zaki Ochoa, within 3-5 days to evaluate medication change and for hospital follow- up.  The following labs/tests are recommended: BMP, magnesium, CBC.     Activity   Order Comments: Your activity upon discharge: activity as tolerated     Order Specific Question Answer Comments   Is discharge order? Yes      When to contact your care team   Order Comments: Call your primary doctor if you have any of the following: temperature greater than 100.5,  increased shortness of breath or increased pain.     Monitor and record   Order Comments: Weigh yourself every morning     Discharge Follow Up - with Primary Care clinic within 3-5 days (RN to schedule prior to d/c for HE/[facility] primary care     Add follow up information to the AVS prior to printing     Discharge Instructions   Order Comments: Take aspirin 81 mg and Plavix 75 mg daily for 90 days, then  discontinue Plavix and take full dose aspirin 325 mg daily thereafter.  Follow-up with neurology in 3 months.  Discussed follow-up with primary physician for heart failure.  Primary physician to schedule patient for 30-day event monitor per neurology recommendation.     Contact provider   Order Comments: Contact your primary care provider if If increased trouble breathing, new arm/leg swelling, dizziness/passing out, falls, bleeding that doesn't stop, or uncontrolled pain.     Discharge - Quarantine/Isolation Instruction   Order Comments: Date of symptom onset: 07/19/2022   Date of first positive test: unsure, around 07/21/2022  Stay home and away from others (self-isolate) for at least 20 days from when your symptoms started And...   You've had no fevers and no medicine that reduces fever for 1 full day (24 hours)  And...   Your other symptoms have resolved (gotten better).     Diet   Order Comments: Follow this diet upon discharge: Orders Placed This Encounter      Combination Diet 2 gm NA Diet       Order Specific Question Answer Comments   Is discharge order? Yes           Hospital Course   Jeffy Olson is a 89 year old male with past medical history significant for diagnosis of COVID on 7/21/2022, TIA, rheumatoid arthritis, BPH, hypertension, and basal cell skin cancer who was admitted on 8/4/2022 for possible TIA. He had an episode of memory loss with aphasia.  Patient also was found to be in asymptomatic hypoxic respiratory failure secondary to Distolic CHF     TIA  -Stroke protocol in place. Aphasia resolved  -Neurology consulted  -Loaded with Plavix 300 mg and started on 75 mg daily, simvastatin 20 mg at bedtime, aspirin  -MRI brain with and without contrast shows no acute infarct  Cardiac echocardiogram unremarkable  TSH, B12 unremarkable  -A1c 8/4: 5.6  -Lipid panel 8/4: LDL 69, HDL 62, triglycerides 96  -PT and OT ordered  No arrythmias      Acute hypoxic respiratory failure-  BNP mildly elevated,  chest x-ray shows signs of pulmonary congestion but no acute infiltrate.  Had COVID over 2 weeks ago.  Cardiac echocardiogram shows normal ejection fraction, suspect acute diastolic CHF exacerbation   D-dimer mildly elevated, CT shows no PE, some groundglass opacities at the base that appear dependent, discussed with pulmonology, seems more like fluid  Procalcitonin negative, WBC negative, afebrile, had COVID with a mild cough, still have a slight cough that is improved, nonproductive  Treated with IV lasix with resolution of hypoxia     Essential hypertension  -Stable.  Continue amlodipine, atenolol.  Hold hydrochlorothiazide.Lasix started     Rheumatoid arthritis  -On Enbrel and prednisone.  Stable.     # Confirmed COVID-19 infection       Symptom Onset 07/19/2022   Date of 1st Positive Test  07/21/2022   Vaccination Status Fully Vaccinated         Asymptomatic except for very slight cough.  Original symptoms were cough and sore throat and malaise.  No chest pain or shortness of breath but is mildly hypoxic.   Outside the window for any medications.   20 days isolation instead of 10 days due to his rheumatoid arthritis and use of Enbrel and prednisone.     Chronic kidney disease stage III-Baseline creatinine approximately 1.2-1.4, increased to 1.5 , now improved        Hypokalemia, hypomagnesia-replace        Consultations This Hospital Stay   NEUROLOGY IP CONSULT  CARE MANAGEMENT / SOCIAL WORK IP CONSULT  SPEECH LANGUAGE PATH ADULT IP CONSULT  PHARMACY IP CONSULT  PHARMACY IP CONSULT  PHARMACY IP CONSULT  PHYSICAL THERAPY ADULT IP CONSULT  OCCUPATIONAL THERAPY ADULT IP CONSULT  REHAB ADMISSIONS LIAISON IP CONSULT  SMOKING CESSATION PROGRAM IP CONSULT    Code Status   Prior         Greater than 35 minutes spent on coordinating discharge, evaluating labs, studies, evaluating patient, evaluating specialist notes    DARLENE TIRADO MD  [address]  [address]  Phone: [phone]  Fax: [phone]  ______________________________________________________________________         Primary Care Physician   Zaki Ochoa    Discharge Orders      Follow-Up with Cardiology [facility] Heart Failure Discharge      COVID-19 GetWell Loop Referral      Reason for your hospital stay    TIA, CHF     Follow-up and recommended labs and tests    Follow up with primary care provider, Zaki Ochoa, within 3-5 days to evaluate medication change and for hospital follow- up.  The following labs/tests are recommended: BMP, magnesium, CBC.     Activity    Your activity upon discharge: activity as tolerated     When to contact your care team    Call your primary doctor if you have any of the following: temperature greater than 100.5,  increased shortness of breath or increased pain.     Monitor and record    Weigh yourself every morning     Discharge Follow Up - with Primary Care clinic within 3-5 days (RN to schedule prior to d/c for HE/[facility] primary care     Add follow up information to the AVS prior to printing     Discharge Instructions    Take aspirin 81 mg and Plavix 75 mg daily for 90 days, then discontinue Plavix and take full dose aspirin 325 mg daily thereafter.  Follow-up with neurology in 3 months.  Discussed follow-up with primary physician for heart failure.  Primary physician to schedule patient for 30-day event monitor per neurology recommendation.     Contact provider    Contact your primary care provider if If increased trouble breathing, new arm/leg swelling, dizziness/passing out, falls, bleeding that doesn't stop, or uncontrolled pain.     Discharge - Quarantine/Isolation Instruction    Date of symptom onset: 07/19/2022   Date of first positive test: unsure, around 07/21/2022  Stay home and away from others (self-isolate) for at least 20 days from when your symptoms started And...   You've had no fevers and no medicine that reduces fever for 1 full day (24 hours)   And...   Your other symptoms have resolved (gotten better).     Diet    Follow this diet upon discharge: Orders Placed This Encounter      Combination Diet 2 gm NA Diet         Significant Results and Procedures   Most Recent 3 CBC's:  Recent Labs   Lab Test 08/05/22  1045 08/04/22  1739 09/20/21  1212   WBC 6.1 7.6 9.0   HGB 15.0 15.7 15.1   MCV 92 91 94    228 197     Most Recent 3 BMP's:  Recent Labs   Lab Test 08/08/22  0812 08/08/22  0708 08/07/22  0822 08/06/22  2344 08/06/22  1147 08/06/22  0842   NA  --  133* 134*  --   --  133*   POTASSIUM  --  3.9 4.0 3.9  --  3.4*   CHLORIDE  --  98 97*  --   --  94*   CO2  --  25 28  --   --  29   BUN  --  27 24  --   --  22   CR  --  1.27 1.35*  --   --  1.47*   ANIONGAP  --  10 9  --   --  10   SELMA  --  9.1 9.1  --   --  9.0   * 92 95  --    < > 127*    < > = values in this interval not displayed.     Most Recent 2 LFT's:  Recent Labs   Lab Test 09/20/21  1212 10/27/20  1656   AST 18 15   ALT 10 10   ALKPHOS 94 91   BILITOTAL 1.3* 1.6*   ,   Results for orders placed or performed during the hospital encounter of 08/04/22   CTA Head Neck with Contrast     Value    Radiologist flags Results from the CT noncontrast head (AA)    Narrative    EXAM: CTA HEAD NECK W CONTRAST  LOCATION: [facility]  DATE/TIME: 8/4/2022 5:20 PM    INDICATION: Transient speech changes.    COMPARISON: CT head without contrast 12/24/2015.    CONTRAST: 75 mL Isovue 370.    TECHNIQUE: Head and neck CT angiogram with IV contrast. Noncontrast head CT followed by axial helical CT images of the head and neck vessels obtained during the arterial phase of intravenous contrast administration. Axial 2D reconstructed images and   multiplanar 3D MIP reconstructed images of the head and neck vessels were performed by the technologist. Dose reduction techniques were used. All stenosis measurements made according to NASCET criteria unless otherwise specified.    FINDINGS:    NONCONTRAST HEAD CT:   INTRACRANIAL CONTENTS: No intracranial hemorrhage, extra-axial collection, or mass effect. No CT evidence of acute infarct. Mild presumed chronic small vessel ischemic changes. Mild generalized volume loss. No hydrocephalus.     VISUALIZED ORBITS/SINUSES/MASTOIDS: Prior bilateral cataract surgery. Visualized portions of the orbits are otherwise unremarkable. Mild mucosal thickening scattered about the paranasal sinuses. No middle ear or mastoid effusion.    BONES/SOFT TISSUES: No acute abnormality.    HEAD CTA:  ANTERIOR CIRCULATION: No stenosis/occlusion, aneurysm, or high flow vascular malformation. Standard [place] anatomy.    POSTERIOR CIRCULATION: No stenosis/occlusion, aneurysm, or high flow vascular malformation. Dominant right and smaller left vertebral artery contribute to a normal basilar artery.     DURAL VENOUS SINUSES: Not well evaluated on a technical basis.    NECK CTA:  RIGHT CAROTID: No measurable stenosis or dissection.    LEFT CAROTID: No measurable stenosis or dissection.    VERTEBRAL ARTERIES: No focal stenosis or dissection on the right. Dominant right vertebral artery. Diminutive appearance of the left vertebral artery with some areas not well visualized/opacified.     AORTIC ARCH: Classic aortic arch anatomy with no significant stenosis at the origin of the great vessels.    NONVASCULAR STRUCTURES: Emphysema. Osseous fusion of C5 to C7 on a chronic basis.      Impression    IMPRESSION:   NONCONTRAST HEAD CT:  1.  Unremarkable examination for age. No acute intracranial process.    [Critical Result: Results from the CT noncontrast head]    Finding was identified on 8/4/2022 5:30 PM.     Dr. Purvi Jordan was contacted by me on 8/4/2022 5:36 PM and verbalized understanding of the critical result.     HEAD CTA:   1.  No significant stenosis, aneurysm, or high flow vascular malformation identified.    NECK CTA:  1.  No significant carotid stenosis.  2.  Right  dominant vertebral artery is patent. Diminutive appearance of the left vertebral artery with some areas not well visualized/opacified. This may be on a developmental basis or related to stenosis/chronic occlusion.     MR Brain w/o & w Contrast    Narrative    EXAM: MR BRAIN W/O and W CONTRAST  LOCATION: [facility]  DATE/TIME: 8/5/2022 6:49 AM    INDICATION: Transient ischemic attack. Speech difficulty. Transient aphasia and confusion.  COMPARISON: Head and neck CTA 8/4/2022, brain MRI 12/16/2015  CONTRAST: 7ml gadavist   TECHNIQUE: Routine multiplanar multisequence head MRI without and with intravenous contrast.    FINDINGS:  There is no restricted diffusion. Paranasal sinuses are free from significant disease. Mastoid air cells appear free from significant disease. Intraorbital contents are unremarkable. There is mild to moderate generalized prominence of the sulci and   ventricles, consistent with underlying volume loss. Intracranial flow voids are intact. There is no mass effect, midline shift, or extraaxial collection. Mild periventricular T2 hyperintensity most consistent with chronic small vessel ischemic related   gliosis. No evidence for acute or chronic intracranial blood products.      Impression    IMPRESSION:  1.  No acute intracranial finding. No evidence for recent ischemia, intracranial hemorrhage, or mass.   XR Chest Port 1 View    Narrative    EXAM: XR CHEST PORT 1 VIEW  LOCATION: [facility]  DATE/TIME: 8/5/2022 12:51 PM    INDICATION: TIA  COMPARISON: 08/03/2022      Impression    IMPRESSION: Increasing prominence pulmonary vessels could represent early pulmonary vascular congestion. No pleural effusion.    Stable heart size. Interstitial prominence could represent edema, pneumonia, or chronic interstitial lung disease.   CT Chest Pulmonary Embolism w Contrast    Narrative    EXAM: CT CHEST PULMONARY EMBOLISM W CONTRAST  LOCATION: [facility]  DATE/TIME: 2022 3:42 PM    INDICATION: hypoxia  COMPARISON: None.  TECHNIQUE: CT chest pulmonary angiogram during arterial phase injection of IV contrast. Multiplanar reformats and MIP reconstructions were performed. Dose reduction techniques were used.   CONTRAST: [id number] 75ml    FINDINGS:  ANGIOGRAM CHEST: Pulmonary arteries are negative for pulmonary emboli. Thoracic aorta is negative for dissection. No CT evidence of right heart strain. The right main pulmonary artery measures 3.9 cm in size. The left main pulmonary artery measures 3.6   cm in size. This can be seen with pulmonary arterial hypertension.    LUNGS AND PLEURA: Mild emphysematous changes. No focal infiltrate, pleural effusion, or pneumothorax. Ground glass opacities identified in both lung bases, chronic versus acute.    MEDIASTINUM/AXILLAE: No mediastinal, hilar, or axillary lymphadenopathy.    CORONARY ARTERY CALCIFICATION: Mild.    UPPER ABDOMEN: Normal.    MUSCULOSKELETAL: Mild to moderate degenerative change.      Impression    IMPRESSION:  1.  No evidence of pulmonary embolism or aortic dissection.  2.  Dilatation of the right and left main pulmonary arteries which can be seen with pulmonary arterial hypertension.  3.  Mild emphysema.  4.  Ground opacities identified in both lung bases, chronic versus acute.   Echocardiogram Complete    Narrative    [id number]  [id number]  [id number]  [id number]^SARAH^[place] [address]     Name: EDUARDO DEVRIES  MRN: [id number]  : 1931  Study Date: 2022 01:56 PM  Age: 89 yrs  Gender: Male  Patient Location: [facility]  Reason For Study: TIA  Ordering Physician: YOVANY SMITH  Performed By: ANA PAULA     BSA: 1.9 m2  Height: 70 in  Weight: 165 lb  HR: 53  BP: 156/71 mmHg  ______________________________________________________________________________  Procedure  Complete Portable Echo Adult. Definity (NDC #01809-159) given  intravenously.  ______________________________________________________________________________  Interpretation Summary     1. Normal left ventricular size and systolic performance with a visually  estimated ejection fraction of 55%.  2. There is mild aortic insufficiency.  3. Normal right ventricular size and systolic performance.  4. There is mild left atrial enlargement.  5. There is mild enlargement of the proximal ascending aorta.  ______________________________________________________________________________  Left ventricle:  Normal left ventricular size and systolic performance with a visually  estimated ejection fraction of 55%. There is normal regional wall motion. Left  ventricular wall thickness is normal.     Assessment of LV Diastolic Function: The cumulative findings suggest normal  diastolic filling [The septal e' velocity is < 7 cm/s & lateral e' velocity  is  < 10 cm/s. The average E/e' is < 14. The TR velocity cannot be determined due  to insufficient tricuspid insufficiency signal. Left atrial volume index is  less than 34 mL/mÂ ].     Right ventricle:  Normal right ventricular size and systolic performance.     Left atrium:  There is mild left atrial enlargement.     Right atrium:  There is borderline right atrial enlargement.     IVC:  The IVC is of normal caliber.     Aortic valve:  The aortic valve is comprised of three cusps. There is mild aortic valve  sclerosis without significant stenosis. There is mild aortic insufficiency.     Mitral valve:  The mitral valve appears morphologically normal. There is trace mitral  insufficiency.     Tricuspid valve:  The tricuspid valve is grossly morphologically normal. There is trace  tricuspid insufficiency.     Pulmonic valve:  The pulmonic valve is grossly morphologically normal. There is trace pulmonic  insufficiency.     Thoracic aorta:  There is mild enlargement of the proximal ascending aorta.     Pericardium:  There is no significant pericardial  effusion.  ______________________________________________________________________________  ______________________________________________________________________________  MMode/2D Measurements & Calculations  IVSd: 1.1 cm  LVIDd: 5.6 cm  LVIDs: 3.9 cm  LVPWd: 1.0 cm  FS: 29.8 %  LV mass(C)d: 232.5 grams  LV mass(C)dI: 120.9 grams/m2  Ao root diam: 3.7 cm  LA dimension: 5.0 cm  asc Aorta Diam: 4.0 cm  LA/Ao: 1.4  LVOT diam: 2.2 cm  LVOT area: 3.9 cm2  LA Volume Indexed (AL/bp): 30.4 ml/m2     RWT: 0.36     Time Measurements  Aortic HR: 64.0 BPM  MM HR: 62.0 BPM     Doppler Measurements & Calculations  MV E max chidi: 53.1 cm/sec  MV A max chidi: 106.7 cm/sec  MV E/A: 0.50  MV dec slope: 191.0 cm/sec2  MV dec time: 0.28 sec  Ao V2 max: 175.1 cm/sec  Ao max P.0 mmHg  Ao V2 mean: 127.3 cm/sec  Ao mean P.1 mmHg  Ao V2 VTI: 36.3 cm  SUSSY(I,D): 2.0 cm2  SUSSY(V,D): 2.0 cm2  AI P1/2t: 405.8 msec  LV V1 max PG: 3.3 mmHg  LV V1 max: 90.4 cm/sec  LV V1 VTI: 19.0 cm  CO(LVOT): 4.7 l/min  CI(LVOT): 2.4 l/min/m2  SV(LVOT): 73.4 ml  SI(LVOT): 38.2 ml/m2  PA acc time: 0.10 sec  AV Chidi Ratio (DI): 0.52  SUSSY Index (cm2/m2): 1.1     E/E': 10.4  E/E' avg: 10.4  Lateral E/e': 9.8  Medial E/e': 11.1  Peak E' Chidi: 5.1 cm/sec     ______________________________________________________________________________  Report approved by: Ole Bartlett 2022 03:19 PM               Discharge Medications   Discharge Medication List as of 2022 11:48 AM      START taking these medications    Details   aspirin (ASA) 81 MG EC tablet Take 1 tablet (81 mg) by mouth daily, OTC      clopidogrel (PLAVIX) 75 MG tablet Take 1 tablet (75 mg) by mouth daily, Disp-30 tablet, R-0, E-Prescribe      furosemide (LASIX) 20 MG tablet Take 1 tablet (20 mg) by mouth daily, Disp-30 tablet, R-0, E-Prescribe         CONTINUE these medications which have NOT CHANGED    Details   amLODIPine (NORVASC) 5 MG tablet Take 5 mg by mouth daily, Historical     "  atenolol (TENORMIN) 25 MG tablet Take 50 mg by mouth daily, Historical      cyanocobalamin 1,000 mcg/mL injection [CYANOCOBALAMIN 1,000 MCG/ML INJECTION] Inject 1,000 mcg into the shoulder, thigh, or buttocks every 30 (thirty) days., Historical      etanercept (ENBREL) 50 mg/mL (0.98 mL) injection [ETANERCEPT (ENBREL) 50 MG/ML (0.98 ML) INJECTION] Inject 50 mg under the skin once a week., Historical      loperamide (IMODIUM A-D) 2 mg tablet Take 4 mg by mouth daily as needed, Historical      methylcellulose (CITRUCEL) 500 mg Tab [METHYLCELLULOSE (CITRUCEL) 500 MG TAB] Take 1,000 mg by mouth daily., Historical      Multiple Vitamins-Minerals (PRESERVISION AREDS 2) CAPS Take 1 capsule by mouth 2 times daily, Historical      predniSONE (DELTASONE) 1 MG tablet Take 4 mg by mouth daily, Historical      Simethicone 125 MG CAPS Take 1 capsule by mouth 2 times daily as needed, Historical      simvastatin (ZOCOR) 20 MG tablet Take 20 mg by mouth At Bedtime, Historical         STOP taking these medications       hydrochlorothiazide (HYDRODIURIL) 25 MG tablet Comments:   Reason for Stopping:             Allergies   No Known Allergies    Physical Exam:       BP (!) 142/61 (BP Location: Left arm)   Pulse 65   Temp 97.6  F (36.4  C) (Oral)   Resp 18   Ht 1.778 m (5' 10\")   Wt 70.8 kg (156 lb)   SpO2 94%   BMI 22.38 kg/m    General appearance: alert, appears stated age and cooperative  Head: Normocephalic, without obvious abnormality, atraumatic  Eyes: Clear conjuctiva  Neck: no JVD and supple, symmetrical, trachea midline  Lungs: clear to auscultation bilaterally  Heart: regular rate and rhythm, S1, S2 normal, no murmur, click, rub or gallop  Abdomen: soft, non-tender; bowel sounds normal; no masses,  no organomegaly  Extremities: Luís's sign is negative, no sign of DVT  Skin: Skin color, texture, turgor normal. No rashes or lesions  Neurologic: Grossly normal        "

## 2022-08-09 NOTE — PROGRESS NOTES
Clinic Care Coordination Contact  Care Team Conversations    Patient identified for care management outreach, however patient is not on a value based contract so cannot complete outreach. Will escalate to clinic staff if specific needs or resources are indicated.    DOMENIC Kunz  Social Work Care Coordinator - [facility]  Care Coordination  [email or web address]  Cell Phone: [phone]  Gender pronouns: she/her  Employed by [employer]

## 2022-08-12 ENCOUNTER — LAB REQUISITION (OUTPATIENT)
Dept: LAB | Facility: CLINIC | Age: 87
End: 2022-08-12
Payer: MEDICARE

## 2022-08-12 ENCOUNTER — NURSE TRIAGE (OUTPATIENT)
Dept: CARE COORDINATION | Facility: CLINIC | Age: 87
End: 2022-08-12

## 2022-08-12 ENCOUNTER — PATIENT OUTREACH (OUTPATIENT)
Dept: CARE COORDINATION | Facility: CLINIC | Age: 87
End: 2022-08-12

## 2022-08-12 DIAGNOSIS — I10 ESSENTIAL (PRIMARY) HYPERTENSION: ICD-10-CM

## 2022-08-12 DIAGNOSIS — Z86.73 PERSONAL HISTORY OF TRANSIENT ISCHEMIC ATTACK (TIA), AND CEREBRAL INFARCTION WITHOUT RESIDUAL DEFICITS: ICD-10-CM

## 2022-08-12 LAB
ANION GAP SERPL CALCULATED.3IONS-SCNC: 13 MMOL/L (ref 7–15)
BUN SERPL-MCNC: 28.2 MG/DL (ref 8–23)
CALCIUM SERPL-MCNC: 9.2 MG/DL (ref 8.2–9.6)
CHLORIDE SERPL-SCNC: 101 MMOL/L (ref 98–107)
CREAT SERPL-MCNC: 1.52 MG/DL (ref 0.67–1.17)
DEPRECATED HCO3 PLAS-SCNC: 25 MMOL/L (ref 22–29)
ERYTHROCYTE [DISTWIDTH] IN BLOOD BY AUTOMATED COUNT: 14 % (ref 10–15)
GFR SERPL CREATININE-BSD FRML MDRD: 43 ML/MIN/1.73M2
GLUCOSE SERPL-MCNC: 137 MG/DL (ref 70–99)
HCT VFR BLD AUTO: 44.7 % (ref 40–53)
HGB BLD-MCNC: 14.2 G/DL (ref 13.3–17.7)
MAGNESIUM SERPL-MCNC: 2.1 MG/DL (ref 1.7–2.3)
MCH RBC QN AUTO: 30.3 PG (ref 26.5–33)
MCHC RBC AUTO-ENTMCNC: 31.8 G/DL (ref 31.5–36.5)
MCV RBC AUTO: 96 FL (ref 78–100)
PLATELET # BLD AUTO: 273 10E3/UL (ref 150–450)
POTASSIUM SERPL-SCNC: 5.4 MMOL/L (ref 3.4–5.3)
RBC # BLD AUTO: 4.68 10E6/UL (ref 4.4–5.9)
SODIUM SERPL-SCNC: 139 MMOL/L (ref 136–145)
WBC # BLD AUTO: 9.3 10E3/UL (ref 4–11)

## 2022-08-12 PROCEDURE — 80048 BASIC METABOLIC PNL TOTAL CA: CPT | Mod: ORL | Performed by: FAMILY MEDICINE

## 2022-08-12 PROCEDURE — 85027 COMPLETE CBC AUTOMATED: CPT | Mod: ORL | Performed by: FAMILY MEDICINE

## 2022-08-12 PROCEDURE — 83735 ASSAY OF MAGNESIUM: CPT | Mod: ORL | Performed by: FAMILY MEDICINE

## 2022-08-12 NOTE — TELEPHONE ENCOUNTER
"Pt on COVID GetWell Loop with red flag alert for some or all of patient's oxygen levels have been 90% and below, with last reported SpO2 of 90%.  Per review of pt's survey answers today, pt reported his shortness of breath has improved and cough is getting better.  Of note, patient was recently hospitalized at [facility] from 8/4/22 - 8/8/22 with a TIA, Acute diastolic CHF and Acute respiratory failure with hypoxia, and discharged home with spouse.  Patient instructed to follow up with PCP in 3-5 days.  Call made to patient.    Pt reports \"I really haven't felt good, not sure if result of COVID or not.\"  Sats have been as low as 82-83% but improves to 90% after some deep breaths.  Pt checked sats during call with RN, 88-89% - breathing a little harder and \"deeper;\" sats rebounded to 93-94% within 5-10 seconds after several deep breaths.      Yesterday had a BM and had blood on the toilet paper and small amount in the toilet, so he didn't take it yesterday or today and will discuss with PCP at appt today.  He has not had any blood in his stool since.  States he also had 3 nosebleeds while in the hospital, but he's not had a nosebleed since discharge.       States he is getting ready to see if PCP (Dr. Kartik Hardy at [facility]) this afternoon at 2:15pm.    RN instructed pt to notify his PCP during his appt today about his lower SpO2 readings and the blood in his stool and several nosebleeds while in the hospital.  Pt verbalized understanding and wrote down symptoms and will bring to his appt today.  RN will continue monitoring GetWell Loop and follow up with patient as needed.      Shadia Aguiar, RN  [facility] - Ambulatory Care Management             "

## 2022-08-13 ENCOUNTER — NURSE TRIAGE (OUTPATIENT)
Dept: CARE COORDINATION | Facility: CLINIC | Age: 87
End: 2022-08-13

## 2022-08-13 NOTE — TELEPHONE ENCOUNTER
Nurse Triage SBAR    Is this a 2nd Level Triage? NO    Situation:     Pt on COVID GetWell Loop with red flag alert for some or all of patient's oxygen levels have been 90% and below, with last reported SpO2 of 90%.  Per review of pt's survey answers today, pt reported his shortness of breath has improved and is speaking in full sentences at this time. His O2 level has improved and the lowest he has seen today is 90% which is an improvement from yesterday. No signs of respiratory distress in his voice. Patient does not feel the need to go to the ED or seek immediate care as his symptoms have improved since yesterday.     Background:   Pt on COVID GetWell Loop with red flag alert for some or all of patient's oxygen levels have been 90% and below, with last reported SpO2 of 90%.  Per review of pt's survey answers today, pt reported his shortness of breath has improved and cough is getting better.  Of note, patient was recently hospitalized at [facility] from 8/4/22 - 8/8/22 with a TIA, Acute diastolic CHF and Acute respiratory failure with hypoxia, and discharged home with spouse.     Assessment:   Patient is in no distress and reports improvement of cough. No shortness of breath or fever. He had the same O2 levels yesterday and met with his PCP who had no further concerns or recommendations. Due to the recent visit with his PCP and the improvement in symptoms, patient does not feel the need to seek further treatment.    Protocol Recommended Disposition:   Go to ED Now    Recommendation:        No actions at this time    Does the patient meet one of the following criteria for ADS visit consideration? No  Reason for Disposition    Oxygen level (e.g., pulse oximetry) 90 percent or lower    Additional Information    Negative: SEVERE difficulty breathing (e.g., struggling for each breath, speaks in single words)    Negative: [1] SEVERE weakness (e.g., can't stand or can barely walk) AND [2] new-onset or WORSE     Negative: Difficult to awaken or acting confused (e.g., disoriented, slurred speech)    Negative: Bluish (or gray) lips or face now    Negative: Sounds like a life-threatening emergency to the triager    Negative: [1] Typical COVID-19 symptoms AND [2] lasting less than 3 weeks    Negative: [1] Chest pain, pressure, or tightness AND [2] new-onset or worsening    Negative: [1] Fever AND [2] new-onset or worsening    Protocols used: CORONAVIRUS (COVID-19) PERSISTING SYMPTOMS FOLLOW-UP CALL-A- 1.18.2022

## 2022-08-26 ENCOUNTER — LAB REQUISITION (OUTPATIENT)
Dept: LAB | Facility: CLINIC | Age: 87
End: 2022-08-26
Payer: MEDICARE

## 2022-08-26 DIAGNOSIS — I10 ESSENTIAL (PRIMARY) HYPERTENSION: ICD-10-CM

## 2022-08-26 LAB
ANION GAP SERPL CALCULATED.3IONS-SCNC: 13 MMOL/L (ref 7–15)
BUN SERPL-MCNC: 24 MG/DL (ref 8–23)
CALCIUM SERPL-MCNC: 9.2 MG/DL (ref 8.2–9.6)
CHLORIDE SERPL-SCNC: 100 MMOL/L (ref 98–107)
CREAT SERPL-MCNC: 1.31 MG/DL (ref 0.67–1.17)
DEPRECATED HCO3 PLAS-SCNC: 27 MMOL/L (ref 22–29)
GFR SERPL CREATININE-BSD FRML MDRD: 52 ML/MIN/1.73M2
GLUCOSE SERPL-MCNC: 69 MG/DL (ref 70–99)
POTASSIUM SERPL-SCNC: 5.3 MMOL/L (ref 3.4–5.3)
SODIUM SERPL-SCNC: 140 MMOL/L (ref 136–145)

## 2022-08-26 PROCEDURE — 80048 BASIC METABOLIC PNL TOTAL CA: CPT | Mod: ORL | Performed by: FAMILY MEDICINE

## 2022-09-25 ENCOUNTER — HEALTH MAINTENANCE LETTER (OUTPATIENT)
Age: 87
End: 2022-09-25

## 2022-09-26 ENCOUNTER — LAB REQUISITION (OUTPATIENT)
Dept: LAB | Facility: CLINIC | Age: 87
End: 2022-09-26
Payer: MEDICARE

## 2022-09-26 DIAGNOSIS — M35.3 POLYMYALGIA RHEUMATICA (H): ICD-10-CM

## 2022-09-26 LAB — ERYTHROCYTE [SEDIMENTATION RATE] IN BLOOD BY WESTERGREN METHOD: 32 MM/HR (ref 0–20)

## 2022-09-26 PROCEDURE — 85652 RBC SED RATE AUTOMATED: CPT | Mod: ORL | Performed by: FAMILY MEDICINE

## 2022-09-28 ENCOUNTER — LAB REQUISITION (OUTPATIENT)
Dept: LAB | Facility: CLINIC | Age: 87
End: 2022-09-28
Payer: MEDICARE

## 2022-09-28 DIAGNOSIS — M06.9 RHEUMATOID ARTHRITIS, UNSPECIFIED (H): ICD-10-CM

## 2022-09-28 LAB
ALBUMIN SERPL BCG-MCNC: 3.6 G/DL (ref 3.5–5.2)
ALP SERPL-CCNC: 98 U/L (ref 40–129)
ALT SERPL W P-5'-P-CCNC: 10 U/L (ref 10–50)
ANION GAP SERPL CALCULATED.3IONS-SCNC: 8 MMOL/L (ref 7–15)
AST SERPL W P-5'-P-CCNC: 22 U/L (ref 10–50)
BASOPHILS # BLD AUTO: 0.1 10E3/UL (ref 0–0.2)
BASOPHILS NFR BLD AUTO: 1 %
BILIRUB SERPL-MCNC: 0.7 MG/DL
BUN SERPL-MCNC: 17.5 MG/DL (ref 8–23)
CALCIUM SERPL-MCNC: 9 MG/DL (ref 8.2–9.6)
CHLORIDE SERPL-SCNC: 100 MMOL/L (ref 98–107)
CREAT SERPL-MCNC: 1.26 MG/DL (ref 0.67–1.17)
CRP SERPL-MCNC: 17.2 MG/L
DEPRECATED HCO3 PLAS-SCNC: 27 MMOL/L (ref 22–29)
EOSINOPHIL # BLD AUTO: 0.2 10E3/UL (ref 0–0.7)
EOSINOPHIL NFR BLD AUTO: 2 %
ERYTHROCYTE [DISTWIDTH] IN BLOOD BY AUTOMATED COUNT: 13.9 % (ref 10–15)
GFR SERPL CREATININE-BSD FRML MDRD: 54 ML/MIN/1.73M2
GLUCOSE SERPL-MCNC: 70 MG/DL (ref 70–99)
HCT VFR BLD AUTO: 45.7 % (ref 40–53)
HGB BLD-MCNC: 14.5 G/DL (ref 13.3–17.7)
IMM GRANULOCYTES # BLD: 0 10E3/UL
IMM GRANULOCYTES NFR BLD: 0 %
LYMPHOCYTES # BLD AUTO: 5.2 10E3/UL (ref 0.8–5.3)
LYMPHOCYTES NFR BLD AUTO: 55 %
MCH RBC QN AUTO: 30.6 PG (ref 26.5–33)
MCHC RBC AUTO-ENTMCNC: 31.7 G/DL (ref 31.5–36.5)
MCV RBC AUTO: 96 FL (ref 78–100)
MONOCYTES # BLD AUTO: 1.4 10E3/UL (ref 0–1.3)
MONOCYTES NFR BLD AUTO: 15 %
NEUTROPHILS # BLD AUTO: 2.5 10E3/UL (ref 1.6–8.3)
NEUTROPHILS NFR BLD AUTO: 27 %
NRBC # BLD AUTO: 0 10E3/UL
NRBC BLD AUTO-RTO: 0 /100
PLAT MORPH BLD: NORMAL
PLATELET # BLD AUTO: 232 10E3/UL (ref 150–450)
POTASSIUM SERPL-SCNC: 5.1 MMOL/L (ref 3.4–5.3)
PROT SERPL-MCNC: 7 G/DL (ref 6.4–8.3)
RBC # BLD AUTO: 4.74 10E6/UL (ref 4.4–5.9)
RBC MORPH BLD: NORMAL
SODIUM SERPL-SCNC: 135 MMOL/L (ref 136–145)
WBC # BLD AUTO: 9.3 10E3/UL (ref 4–11)

## 2022-09-28 PROCEDURE — 80053 COMPREHEN METABOLIC PANEL: CPT | Mod: ORL | Performed by: FAMILY MEDICINE

## 2022-09-28 PROCEDURE — 85025 COMPLETE CBC W/AUTO DIFF WBC: CPT | Mod: ORL | Performed by: FAMILY MEDICINE

## 2022-09-28 PROCEDURE — 86140 C-REACTIVE PROTEIN: CPT | Mod: ORL | Performed by: FAMILY MEDICINE

## 2022-11-08 ENCOUNTER — LAB REQUISITION (OUTPATIENT)
Dept: LAB | Facility: CLINIC | Age: 87
End: 2022-11-08
Payer: MEDICARE

## 2022-11-08 DIAGNOSIS — M35.3 POLYMYALGIA RHEUMATICA (H): ICD-10-CM

## 2022-11-08 LAB — ERYTHROCYTE [SEDIMENTATION RATE] IN BLOOD BY WESTERGREN METHOD: 13 MM/HR (ref 0–20)

## 2022-11-08 PROCEDURE — 85652 RBC SED RATE AUTOMATED: CPT | Mod: ORL | Performed by: FAMILY MEDICINE

## 2022-11-25 ENCOUNTER — LAB REQUISITION (OUTPATIENT)
Dept: LAB | Facility: CLINIC | Age: 87
End: 2022-11-25
Payer: MEDICARE

## 2022-11-25 DIAGNOSIS — I10 ESSENTIAL (PRIMARY) HYPERTENSION: ICD-10-CM

## 2022-11-25 LAB
ANION GAP SERPL CALCULATED.3IONS-SCNC: 13 MMOL/L (ref 7–15)
BUN SERPL-MCNC: 17.9 MG/DL (ref 8–23)
CALCIUM SERPL-MCNC: 8.9 MG/DL (ref 8.2–9.6)
CHLORIDE SERPL-SCNC: 102 MMOL/L (ref 98–107)
CREAT SERPL-MCNC: 1.22 MG/DL (ref 0.67–1.17)
DEPRECATED HCO3 PLAS-SCNC: 25 MMOL/L (ref 22–29)
GFR SERPL CREATININE-BSD FRML MDRD: 56 ML/MIN/1.73M2
GLUCOSE SERPL-MCNC: 72 MG/DL (ref 70–99)
POTASSIUM SERPL-SCNC: 4.9 MMOL/L (ref 3.4–5.3)
SODIUM SERPL-SCNC: 140 MMOL/L (ref 136–145)

## 2022-11-25 PROCEDURE — 80048 BASIC METABOLIC PNL TOTAL CA: CPT | Mod: ORL | Performed by: FAMILY MEDICINE

## 2023-01-03 ENCOUNTER — LAB REQUISITION (OUTPATIENT)
Dept: LAB | Facility: CLINIC | Age: 88
End: 2023-01-03
Payer: MEDICARE

## 2023-01-03 DIAGNOSIS — M35.3 POLYMYALGIA RHEUMATICA (H): ICD-10-CM

## 2023-01-03 LAB — ERYTHROCYTE [SEDIMENTATION RATE] IN BLOOD BY WESTERGREN METHOD: 13 MM/HR (ref 0–20)

## 2023-01-03 PROCEDURE — 85652 RBC SED RATE AUTOMATED: CPT | Mod: ORL | Performed by: FAMILY MEDICINE

## 2023-05-01 ENCOUNTER — LAB REQUISITION (OUTPATIENT)
Dept: LAB | Facility: CLINIC | Age: 88
End: 2023-05-01
Payer: MEDICARE

## 2023-05-01 DIAGNOSIS — M35.3 POLYMYALGIA RHEUMATICA (H): ICD-10-CM

## 2023-05-01 LAB — ERYTHROCYTE [SEDIMENTATION RATE] IN BLOOD BY WESTERGREN METHOD: 14 MM/HR (ref 0–20)

## 2023-05-01 PROCEDURE — 85652 RBC SED RATE AUTOMATED: CPT | Mod: ORL | Performed by: FAMILY MEDICINE

## 2023-05-08 ENCOUNTER — LAB REQUISITION (OUTPATIENT)
Dept: LAB | Facility: CLINIC | Age: 88
End: 2023-05-08
Payer: MEDICARE

## 2023-05-08 DIAGNOSIS — E78.5 HYPERLIPIDEMIA, UNSPECIFIED: ICD-10-CM

## 2023-05-08 PROCEDURE — 80061 LIPID PANEL: CPT | Mod: ORL | Performed by: FAMILY MEDICINE

## 2023-05-08 PROCEDURE — 80053 COMPREHEN METABOLIC PANEL: CPT | Mod: ORL | Performed by: FAMILY MEDICINE

## 2023-05-09 LAB
ALBUMIN SERPL BCG-MCNC: 3.6 G/DL (ref 3.5–5.2)
ALP SERPL-CCNC: 97 U/L (ref 40–129)
ALT SERPL W P-5'-P-CCNC: 11 U/L (ref 10–50)
ANION GAP SERPL CALCULATED.3IONS-SCNC: 11 MMOL/L (ref 7–15)
AST SERPL W P-5'-P-CCNC: 19 U/L (ref 10–50)
BILIRUB SERPL-MCNC: 0.8 MG/DL
BUN SERPL-MCNC: 19.6 MG/DL (ref 8–23)
CALCIUM SERPL-MCNC: 8.6 MG/DL (ref 8.2–9.6)
CHLORIDE SERPL-SCNC: 103 MMOL/L (ref 98–107)
CHOLEST SERPL-MCNC: 123 MG/DL
CREAT SERPL-MCNC: 1.34 MG/DL (ref 0.67–1.17)
DEPRECATED HCO3 PLAS-SCNC: 25 MMOL/L (ref 22–29)
GFR SERPL CREATININE-BSD FRML MDRD: 50 ML/MIN/1.73M2
GLUCOSE SERPL-MCNC: 92 MG/DL (ref 70–99)
HDLC SERPL-MCNC: 78 MG/DL
LDLC SERPL CALC-MCNC: 27 MG/DL
NONHDLC SERPL-MCNC: 45 MG/DL
POTASSIUM SERPL-SCNC: 4.6 MMOL/L (ref 3.4–5.3)
PROT SERPL-MCNC: 7.3 G/DL (ref 6.4–8.3)
SODIUM SERPL-SCNC: 139 MMOL/L (ref 136–145)
TRIGL SERPL-MCNC: 91 MG/DL

## 2023-06-19 ENCOUNTER — LAB REQUISITION (OUTPATIENT)
Dept: LAB | Facility: CLINIC | Age: 88
End: 2023-06-19
Payer: MEDICARE

## 2023-06-19 DIAGNOSIS — M35.3 POLYMYALGIA RHEUMATICA (H): ICD-10-CM

## 2023-06-19 LAB — ERYTHROCYTE [SEDIMENTATION RATE] IN BLOOD BY WESTERGREN METHOD: 20 MM/HR (ref 0–20)

## 2023-06-19 PROCEDURE — 85652 RBC SED RATE AUTOMATED: CPT | Mod: ORL | Performed by: FAMILY MEDICINE

## 2023-08-22 ENCOUNTER — LAB REQUISITION (OUTPATIENT)
Dept: LAB | Facility: CLINIC | Age: 88
End: 2023-08-22
Payer: MEDICARE

## 2023-08-22 DIAGNOSIS — M35.3 POLYMYALGIA RHEUMATICA (H): ICD-10-CM

## 2023-08-22 LAB — ERYTHROCYTE [SEDIMENTATION RATE] IN BLOOD BY WESTERGREN METHOD: 20 MM/HR (ref 0–20)

## 2023-08-22 PROCEDURE — 85652 RBC SED RATE AUTOMATED: CPT | Mod: ORL | Performed by: FAMILY MEDICINE

## 2023-11-04 ENCOUNTER — HEALTH MAINTENANCE LETTER (OUTPATIENT)
Age: 88
End: 2023-11-04

## 2023-12-07 ENCOUNTER — LAB REQUISITION (OUTPATIENT)
Dept: LAB | Facility: CLINIC | Age: 88
End: 2023-12-07
Payer: MEDICARE

## 2023-12-07 DIAGNOSIS — R30.0 DYSURIA: ICD-10-CM

## 2023-12-07 PROCEDURE — 87086 URINE CULTURE/COLONY COUNT: CPT | Mod: ORL | Performed by: FAMILY MEDICINE

## 2023-12-08 LAB — BACTERIA UR CULT: NO GROWTH

## 2024-03-28 ENCOUNTER — TRANSCRIBE ORDERS (OUTPATIENT)
Dept: OTHER | Age: 89
End: 2024-03-28

## 2024-03-28 DIAGNOSIS — S01.309A: Primary | ICD-10-CM

## 2024-03-29 ENCOUNTER — MEDICAL CORRESPONDENCE (OUTPATIENT)
Dept: HEALTH INFORMATION MANAGEMENT | Facility: CLINIC | Age: 89
End: 2024-03-29
Payer: MEDICARE

## 2024-04-03 ENCOUNTER — TELEPHONE (OUTPATIENT)
Dept: VASCULAR SURGERY | Facility: CLINIC | Age: 89
End: 2024-04-03
Payer: MEDICARE

## 2024-04-03 NOTE — TELEPHONE ENCOUNTER
Caller: Patient      Detailed reason for call: Pt called to get into clinic to see a wound provider. Referral was made by his PCP but pt does not want to go to the . He requested to be seen here in [place]. Spoke with [facility] nurse, Mildred and ok to put in 20 minutes slot with . Pt will be seen on Monday 4/8/24 here in [place]. Pt reports he has a wound behind his Rt ear from his oxygen tubing.     Best phone number to contact: [phone]        (Noted to patient if reason is related to wound or incision, to please send a photo via email or [email or web address].)

## 2024-04-04 NOTE — PATIENT INSTRUCTIONS
Wound care supplies were ordered today through [facility] and if you are not receiving your supplies or have a question on your bill please contact Linda Wiley at [phone]. Please allow 2-5 business days for delivery of supplies. You may get a call from a 1-800 # if there are additional information [facility] needs. It is important to  or return their call. PLEASE NOTE: If you need to return your supplies, you MUST call customer service within 15 days of delivery date.         Wound Care Instructions    2 TIMES PER WEEK and as needed, Cleanse your ear wound(s) with Normal saline.    Pat Dry with non-sterile gauze    Primary Dressing: Apply piece of duoderm into/onto the wounds    It is ok to get your wound wet in the bath or shower    See below for a photo of an offloading device    The next time you order new oxygen tubing, you can ask the company if you can get the upgraded tubing that is made out of silicone for around the ears.    High Protein Foods  When you have an open ulcer, your bodies protein needs are much higher, so it is recommended eat good sources of protein or take a protein supplement!    Protein Supplements  -Premier Protein  -Ensure  -Boost  -Glucerna, if diabetic    Chicken  -Chicken breast, 3.5oz.-30 grams protein  -Chicken meat, cooked, 4 oz.    Beef  -Hamburger jonn, 4 oz-28 grams protein  -Steak, 6 oz-42 grams  -Most cuts of beef- 7 grams of protein per ounce    Fish  -Most fish fillets or steaks are about 22 grams of protein for 3 1/2 oz(100 grams) of cooked fish, or 6 grams per ounce  -Tuna, 6 oz can-40 grams of protein    Pork  -Pork chop, average-22 grams protein  -Pork loin or tenderloin, 4 oz.-29 grams  -Ham, 3oz serving- 19 grams  -Frank, 1 slice-3 grams    Eggs and Dairy  -Egg, large-7 grams  -Milk, 1 cup-8 grams  -Cottage cheese, 1/2 cup-15 grams  -Greek yogurt, 1 cup-usually 8-12 grams, check label    Beans  -Soy milk, 1 cup-6-10 grams  -Most beans(black, frederick, lentils,  etc.) about 7-10 grams protein per half cup of cooked beans    Nuts and Seeds  -Peanut butter, 2 Tablespoons- 8 grams protein  -Almonds, 1/4 cup- 8 grams  -Peanuts, 1/4 cup-9 grams  -Sunflower seeds, 1/4 cup- 6 grams        If for some reason you are not able to get your dressing(s) changed as outlined above (due to illness, lack of supplies, lack of help) please do the following: remove old, soiled dressings; wash the wounds with saline; pat dry; apply ABD pad or other absorbant pad and secure with rolled gauze; avoid tape directly on your skin; Call the clinic as soon as possible to let us know what the current issues are in receiving wound care [phone].      SEEK MEDICAL CARE IF:  You have an increase in swelling, pain, or redness around the wound.  You have an increase in the amount of pus coming from the wound.  There is a bad smell coming from the wound.  The wound appears to be worsening/enlarging  You have a fever greater than 101.5 F      It is ok to continue current wound care treatment/products for the next 2-3 days until new wound care supplies are ordered and arrive. If longer than this please contact our office at [phone].    If you have a 2 layer or 4 layer compression wrap on these are safe to have on for ONLY 7 days. If for some reason you are not able to get the wrap(s) changed (due to illness; lack of supplies, lack of help, lack of transportation) please do the following: unwrap the old 2 or 4 layer compression wrap; avoid using scissors as you could cut your skin and cause wounds; use tubular compression when available. Call to reschedule your home care or clinic visit appointment as soon as possible.    Please NOTE: if you are 15 minutes late to your clinic appointment you will have to be rescheduled. Please call our clinic as soon as possible if you know you will not be able to get to your appointment at [phone].    If you fail to show up to 3 scheduled clinic appointments you  will be dismissed from our clinic.              We want to hear from you!  In the next few weeks, you should receive a call or email to complete a survey about your visit at [facility]. Please help us improve your appointment experience by letting us know how we did today. We strive to make your experience good and value any ways in which we could do better.      We value your input and suggestions.    Thank you for choosing the [facility]!

## 2024-04-08 ENCOUNTER — OFFICE VISIT (OUTPATIENT)
Dept: VASCULAR SURGERY | Facility: CLINIC | Age: 89
End: 2024-04-08
Attending: FAMILY MEDICINE
Payer: MEDICARE

## 2024-04-08 VITALS — HEART RATE: 56 BPM | DIASTOLIC BLOOD PRESSURE: 63 MMHG | SYSTOLIC BLOOD PRESSURE: 128 MMHG | OXYGEN SATURATION: 92 %

## 2024-04-08 DIAGNOSIS — S01.301A OPEN WOUND OF RIGHT EAR, UNSPECIFIED OPEN WOUND TYPE, INITIAL ENCOUNTER: ICD-10-CM

## 2024-04-08 PROCEDURE — 99204 OFFICE O/P NEW MOD 45 MIN: CPT | Performed by: FAMILY MEDICINE

## 2024-04-08 PROCEDURE — G0463 HOSPITAL OUTPT CLINIC VISIT: HCPCS | Performed by: FAMILY MEDICINE

## 2024-04-08 RX ORDER — BENZONATATE 200 MG/1
CAPSULE ORAL
COMMUNITY
Start: 2024-03-11

## 2024-04-08 RX ORDER — LACTOBACILLUS RHAMNOSUS GG 10B CELL
1 CAPSULE ORAL
COMMUNITY
Start: 2023-12-24

## 2024-04-08 ASSESSMENT — PAIN SCALES - GENERAL: PAINLEVEL: NO PAIN (0)

## 2024-04-08 NOTE — LETTER
"[facility]  [address] [place] [address] [address]  [place], [place] [address]  [phone]      Fax [phone]    [facility]           Fax: [phone]            Customer Service: [phone]        Account #: [id number]    Wound Dressing Rx and Order Form  Order Status: new  Verbal: Mildred  Date: 2024     Jeffy Olson  Gender: male  : 1931  [address] [place]  [facility] [address]  [phone] (home)     Medical Record: [id number]  Primary Care Provider: Zaki Ochoa      ICD-10-CM    1. Open wound of right ear, unspecified open wound type, initial encounter  S01.301A Wound Care Referral     Wound care            Insurance Info:  INSURER: Payor: MEDICARE / Plan: MEDICARE / Product Type: Medicare /   Policy ID#:  [id number]  SECONDARY INSURANCE:  [employer]  Secondary Policy ID#:  [id number]        Physician Info:   Name:  CHARLEY CARNEY     Dept Address/Phones:   [address] [place] [address], [address]  [facility] [address] [phone]  Fax: [phone]    Lymphedema circumferential measurements (in cm):       No data to display                  Wound info:  VASC Wound right ear (Active)   Pre Size Length 1 24 1500   Pre Size Width 0.5 24 1500   Pre Size Depth 0.1 24 1500   Pre Total Sq cm 0.5 24 1500   Number of days: 0        Drainage: light  Thickness:  full  Duration of Need: 30 DAYS  Days Supply: 30 DAYS  Start Date: 2024  Starter Kit, Ancillary Kit (saline, gloves, gauze): Yes   Qualifying wound/Debridement: Yes     NO SUBSTITUTIONS. Call [phone].      Dressing Type Brand Size Frequency of change  Quantity   Primary duoderm  4\"x4\" 3 TIMES PER WEEK and as needed 5     NO SUBSTITUTIONS. Call [phone] with questions.       OK to forward to covered supplier.    Electronically Signed Physician:  CHARLEY CRANEY             Date: 2024    "

## 2024-04-08 NOTE — PROGRESS NOTES
Wound Clinic Note          Visit date: 04/08/2024       Cheif Complaint:     Jeffy Olson is a 89 year old  male had concerns including Wound Check.  He has a right ear wound.      HISTORY OF PRESENT ILLNESS:    Jeffy Olson reports the ulcer has been present since January 2024.  The wound began when he had to start wearing oxygen tubing.  The wound is due to the oxygen tubing rubbing against the area.       He has tried to use a number of different ointments on the area and is recently been using a powder he got at [facility] to stop bleeding.        The pateint denies fevers or chills.  They report the pain from the wound has been 0/10 and has remained about the same recently.      Today the patient reports maintaining a regular diet without special attention to protein.        The patient denies a history of diabetes, smoking or chronic steroid use.         The patient has not had any symptoms of infection relating to the wound recently and is not currently on antibiotics.       Problem List:   No past medical history on file.           Family Hx: family history is not on file.       Surgical Hx:   Past Surgical History:   Procedure Laterality Date    CATARACT EXTRACTION Bilateral     CERVICAL FUSION      HAND SURGERY Bilateral     HEMORRHOIDECTOMY EXTERNAL      HERNIA REPAIR      x2    RELEASE CARPAL TUNNEL Right     REPLACEMENT TOTAL KNEE Bilateral           Allergies:  No Known Allergies           Medication History:    Current Outpatient Medications   Medication Sig Dispense Refill    amLODIPine (NORVASC) 5 MG tablet Take 5 mg by mouth daily      aspirin (ASA) 81 MG EC tablet Take 1 tablet (81 mg) by mouth daily      atenolol (TENORMIN) 25 MG tablet Take 50 mg by mouth daily      benzonatate (TESSALON) 200 MG capsule TAKE 1 CAPSULE(200 MG) BY MOUTH THREE TIMES DAILY FOR COUGH      cyanocobalamin 1,000 mcg/mL injection [CYANOCOBALAMIN 1,000 MCG/ML INJECTION] Inject 1,000 mcg into the shoulder, thigh, or  buttocks every 30 (thirty) days.      Lactobacillus-Inulin (CULTURELLE DIGESTIVE DAILY) CAPS Take 1 capsule by mouth      loperamide (IMODIUM A-D) 2 mg tablet Take 4 mg by mouth daily as needed      Multiple Vitamins-Minerals (PRESERVISION AREDS 2) CAPS Take 1 capsule by mouth 2 times daily      predniSONE (DELTASONE) 1 MG tablet Take 4 mg by mouth daily      Simethicone 125 MG CAPS Take 1 capsule by mouth 2 times daily as needed      simvastatin (ZOCOR) 20 MG tablet Take 20 mg by mouth At Bedtime      clopidogrel (PLAVIX) 75 MG tablet Take 1 tablet (75 mg) by mouth daily (Patient not taking: Reported on 4/8/2024) 30 tablet 0    etanercept (ENBREL) 50 mg/mL (0.98 mL) injection [ETANERCEPT (ENBREL) 50 MG/ML (0.98 ML) INJECTION] Inject 50 mg under the skin once a week. (Patient not taking: Reported on 4/8/2024)      furosemide (LASIX) 20 MG tablet Take 1 tablet (20 mg) by mouth daily (Patient not taking: Reported on 4/8/2024) 30 tablet 0    methylcellulose (CITRUCEL) 500 mg Tab [METHYLCELLULOSE (CITRUCEL) 500 MG TAB] Take 1,000 mg by mouth daily. (Patient not taking: Reported on 4/8/2024)       No current facility-administered medications for this visit.         Tobacco History:  reports that he quit smoking about 63 years ago. He does not have any smokeless tobacco history on file.       REVIEW OF SYMPTOMS:   The review of systems was negative except as noted in the HPI.           PHYSICAL EXAMINATION:     /63   Pulse 56   SpO2 92%            GENERAL: The patient overall appears well and is no acute distress.   HEAD: normocephalic   EYES: Sclera and conjunctiva clear   NECK: no obvious masses   LUNGS: breathing is unlabored.   EXTREMITIES: No clubbing, cyanosis or edema   SKIN: No rashes or other abnormalities except as noted under the Wound section below.   NEUROLOGICAL: normal motor and sensory function       WOUND/ulcer: The wound appears healthy with no sign of infection.   Wound bed: granulation  tissue  Periwound: healthy intact skin  Fairly small wound behind the right ear.      Also see below for wound details:     Circumferential volume measures:             No data to display                Ulceration(s)/Wound(s):   Please see the media tab under the chart review for pictures of the wounds.  Nursing staff removed dressings and cleansed wound.    VASC Wound right ear (Active)   Pre Size Length 1 04/08/24 1500   Pre Size Width 0.5 04/08/24 1500   Pre Size Depth 0.1 04/08/24 1500   Pre Total Sq cm 0.5 04/08/24 1500           Recent Labs   Lab Test 08/04/22  1739   A1C 5.6          Recent Labs   Lab Test 05/08/23  1617 09/28/22  1108 07/25/22  1344   ALBUMIN 3.6 3.6 3.6              No sharp debridement performed today.                  ASSESSMENT:   This is a 89 year old  male with a right ear wound due to oxygen tubing.          PLAN:   We'll bandage the area with a hydrocolloid bandage changed 2-3 times a week by his wife.  We have recommended that he switch his oxygen tubing to a silicone tubing which will not generally cause these type of pressure sores.  We will also give him information about ordering a headband online which she can use to completely keep the oxygen tubing from touching his ears.  I have explained to the patient the importance of protein intake to wound healing.  I have explained that increasing protein intake will speed wound healing.  We discussed several types of food that are high in protein and the wound care nurse gave the patient a handout that summarizes this information.  In addition to further speed wound healing I have encouraged the patient to take a protein supplement.   The patient will return to the wound clinic in 3 to 4 weeks to see me again.        45 minutes spent on the date of the encounter doing chart review, history and exam, documentation and further activities per the note, this time excludes any procedure time      Augustine Blanton MD  04/08/2024    3:56 PM   [facility]  [phone]    This note was electronically signed by Augustine Blanton MD

## 2024-04-22 NOTE — PATIENT INSTRUCTIONS
Wound care supplies were not ordered or needed today.        Wound Care Instructions    2 TIMES PER WEEK and as needed, Cleanse your ear wound(s) with Normal saline.    Pat Dry with non-sterile gauze    Primary Dressing: Apply piece of duoderm into/onto the wounds    It is ok to get your wound wet in the bath or shower    See below for a photo of an offloading device    The next time you order new oxygen tubing, you can ask the company if you can get the upgraded tubing that is made out of silicone for around the ears.      If for some reason you are not able to get your dressing(s) changed as outlined above (due to illness, lack of supplies, lack of help) please do the following: remove old, soiled dressings; wash the wounds with saline; pat dry; apply ABD pad or other absorbant pad and secure with rolled gauze; avoid tape directly on your skin; Call the clinic as soon as possible to let us know what the current issues are in receiving wound care [phone].      SEEK MEDICAL CARE IF:  You have an increase in swelling, pain, or redness around the wound.  You have an increase in the amount of pus coming from the wound.  There is a bad smell coming from the wound.  The wound appears to be worsening/enlarging  You have a fever greater than 101.5 F      It is ok to continue current wound care treatment/products for the next 2-3 days until new wound care supplies are ordered and arrive. If longer than this please contact our office at [phone].    If you have a 2 layer or 4 layer compression wrap on these are safe to have on for ONLY 7 days. If for some reason you are not able to get the wrap(s) changed (due to illness; lack of supplies, lack of help, lack of transportation) please do the following: unwrap the old 2 or 4 layer compression wrap; avoid using scissors as you could cut your skin and cause wounds; use tubular compression when available. Call to reschedule your home care or clinic visit appointment as  soon as possible.    Please NOTE: if you are 15 minutes late to your clinic appointment you will have to be rescheduled. Please call our clinic as soon as possible if you know you will not be able to get to your appointment at [phone].    If you fail to show up to 3 scheduled clinic appointments you will be dismissed from our clinic.      We want to hear from you!  In the next few weeks, you should receive a call or email to complete a survey about your visit at [facility]. Please help us improve your appointment experience by letting us know how we did today. We strive to make your experience good and value any ways in which we could do better.      We value your input and suggestions.    Thank you for choosing the [facility]!

## 2024-04-29 ENCOUNTER — OFFICE VISIT (OUTPATIENT)
Dept: VASCULAR SURGERY | Facility: CLINIC | Age: 89
End: 2024-04-29
Attending: FAMILY MEDICINE
Payer: MEDICARE

## 2024-04-29 VITALS — DIASTOLIC BLOOD PRESSURE: 96 MMHG | OXYGEN SATURATION: 93 % | HEART RATE: 62 BPM | SYSTOLIC BLOOD PRESSURE: 157 MMHG

## 2024-04-29 DIAGNOSIS — S01.301D OPEN WOUND OF RIGHT EAR, UNSPECIFIED OPEN WOUND TYPE, SUBSEQUENT ENCOUNTER: Primary | ICD-10-CM

## 2024-04-29 PROBLEM — S01.301A OPEN WOUND OF RIGHT EAR: Status: ACTIVE | Noted: 2024-04-29

## 2024-04-29 PROCEDURE — 99214 OFFICE O/P EST MOD 30 MIN: CPT | Performed by: FAMILY MEDICINE

## 2024-04-29 PROCEDURE — G0463 HOSPITAL OUTPT CLINIC VISIT: HCPCS | Performed by: FAMILY MEDICINE

## 2024-04-29 RX ORDER — ATOVAQUONE 750 MG/5ML
SUSPENSION ORAL
COMMUNITY
Start: 2024-01-10

## 2024-04-29 RX ORDER — SULFAMETHOXAZOLE AND TRIMETHOPRIM 400; 80 MG/1; MG/1
1 TABLET ORAL 2 TIMES DAILY
COMMUNITY

## 2024-04-29 RX ORDER — APIXABAN 5 MG/1
TABLET, FILM COATED ORAL
COMMUNITY
Start: 2023-10-27

## 2024-04-29 ASSESSMENT — PAIN SCALES - GENERAL: PAINLEVEL: NO PAIN (0)

## 2024-04-29 NOTE — PROGRESS NOTES
Wound Clinic Note          Visit date: 04/29/2024       Cheif Complaint:     Jeffy Olson is a 89 year old  male had concerns including Wound Check.  He has a right ear wound.      HISTORY OF PRESENT ILLNESS:    Jeffy Olson reports the ulcer has been present since January 2024.  The wound began when he had to start wearing oxygen tubing.  The wound is due to the oxygen tubing rubbing against the area.       Since his last clinic visit with me he did get the silicone oxygen tubing and the headband to keep the oxygen tubing off of his ears.  His wife has been bandaging the area with a hydrocolloid bandage changed every other day.        The pateint denies fevers or chills.  They report the pain from the wound has been 0/10 and has remained about the same recently.      Today the patient reports maintaining a high protein diet, but has not been taking protein supplements lately.        The patient denies a history of diabetes, smoking or chronic steroid use.         The patient has not had any symptoms of infection relating to the wound recently and is not currently on antibiotics.       Problem List:   No past medical history on file.           Family Hx: family history is not on file.       Surgical Hx:   Past Surgical History:   Procedure Laterality Date    CATARACT EXTRACTION Bilateral     CERVICAL FUSION      HAND SURGERY Bilateral     HEMORRHOIDECTOMY EXTERNAL      HERNIA REPAIR      x2    RELEASE CARPAL TUNNEL Right     REPLACEMENT TOTAL KNEE Bilateral           Allergies:  No Known Allergies           Medication History:    Current Outpatient Medications   Medication Sig Dispense Refill    amLODIPine (NORVASC) 5 MG tablet Take 5 mg by mouth daily      atenolol (TENORMIN) 25 MG tablet Take 50 mg by mouth daily      atovaquone (MEPRON) 750 MG/5ML suspension       benzonatate (TESSALON) 200 MG capsule TAKE 1 CAPSULE(200 MG) BY MOUTH THREE TIMES DAILY FOR COUGH      cyanocobalamin 1,000 mcg/mL  injection [CYANOCOBALAMIN 1,000 MCG/ML INJECTION] Inject 1,000 mcg into the shoulder, thigh, or buttocks every 30 (thirty) days.      loperamide (IMODIUM A-D) 2 mg tablet Take 4 mg by mouth daily as needed      predniSONE (DELTASONE) 1 MG tablet Take 4 mg by mouth daily      riTUXimab (RITUXAN IV)       Simethicone 125 MG CAPS Take 1 capsule by mouth 2 times daily as needed      simvastatin (ZOCOR) 20 MG tablet Take 20 mg by mouth At Bedtime      sulfamethoxazole-trimethoprim (BACTRIM) 400-80 MG tablet Take 1 tablet by mouth 2 times daily      aspirin (ASA) 81 MG EC tablet Take 1 tablet (81 mg) by mouth daily (Patient not taking: Reported on 4/29/2024)      clopidogrel (PLAVIX) 75 MG tablet Take 1 tablet (75 mg) by mouth daily (Patient not taking: Reported on 4/8/2024) 30 tablet 0    ELIQUIS ANTICOAGULANT 5 MG tablet  (Patient not taking: Reported on 4/29/2024)      etanercept (ENBREL) 50 mg/mL (0.98 mL) injection [ETANERCEPT (ENBREL) 50 MG/ML (0.98 ML) INJECTION] Inject 50 mg under the skin once a week. (Patient not taking: Reported on 4/8/2024)      furosemide (LASIX) 20 MG tablet Take 1 tablet (20 mg) by mouth daily (Patient not taking: Reported on 4/8/2024) 30 tablet 0    Lactobacillus-Inulin (CULTURELLE DIGESTIVE DAILY) CAPS Take 1 capsule by mouth (Patient not taking: Reported on 4/29/2024)      methylcellulose (CITRUCEL) 500 mg Tab [METHYLCELLULOSE (CITRUCEL) 500 MG TAB] Take 1,000 mg by mouth daily. (Patient not taking: Reported on 4/8/2024)      Multiple Vitamins-Minerals (PRESERVISION AREDS 2) CAPS Take 1 capsule by mouth 2 times daily (Patient not taking: Reported on 4/29/2024)       No current facility-administered medications for this visit.         Tobacco History:  reports that he quit smoking about 63 years ago. He does not have any smokeless tobacco history on file.       REVIEW OF SYMPTOMS:   The review of systems was negative except as noted in the HPI.           PHYSICAL EXAMINATION:     BP (!)  157/96   Pulse 62   SpO2 93%            GENERAL: The patient overall appears well and is no acute distress.   HEAD: normocephalic   EYES: Sclera and conjunctiva clear   NECK: no obvious masses   LUNGS: breathing is unlabored.   EXTREMITIES: No clubbing, cyanosis or edema   SKIN: No rashes or other abnormalities except as noted under the Wound section below.   NEUROLOGICAL: normal motor and sensory function       WOUND/ulcer: The wound appears healthy with no sign of infection.   Wound bed: granulation tissue  Periwound: healthy intact skin  Fairly small wound behind the right ear.  The wound is overall about the same today compared with his last clinic visit.      Also see below for wound details:     Circumferential volume measures:             No data to display                Ulceration(s)/Wound(s):   Please see the media tab under the chart review for pictures of the wounds.  Nursing staff removed dressings and cleansed wound.    VASC Wound right ear (Active)   Pre Size Length 1.5 04/29/24 1500   Pre Size Width 0.7 04/29/24 1500   Pre Size Depth 0.1 04/29/24 1500   Pre Total Sq cm 1.05 04/29/24 1500             Recent Labs   Lab Test 08/04/22  1739   A1C 5.6          Recent Labs   Lab Test 05/08/23  1617 09/28/22  1108 07/25/22  1344   ALBUMIN 3.6 3.6 3.6              No sharp debridement performed today.                  ASSESSMENT:   This is a 89 year old  male with a right ear wound due to oxygen tubing.          PLAN:   We'll bandage the area with a hydrocolloid bandage changed 2-3 times a week by his wife.  The wound is still very superficial and should be protected well with a hydrocolloid bandage.  He has done everything he can to keep pressure off the area I think we just need to give it a little bit more time to see some more progress towards healing in this area.    I have encouraged the patient to continue on their high protein diet to aid in wound healing.   The patient will return to the wound  clinic in 3 to 4 weeks to see me again.        30 minutes spent on the date of the encounter doing chart review, history and exam, documentation and further activities per the note, this time excludes any procedure time      Augustine Blanton MD  04/29/2024   3:30 PM   [facility]  [phone]    This note was electronically signed by Augustine Blanton MD

## 2024-05-16 NOTE — PATIENT INSTRUCTIONS
Wound care supplies were not ordered or needed today.        Wound Care Instructions    2 TIMES PER WEEK and as needed,     1. Cleanse your wound with saline, pat dry. DO NOT REMOVE OLD ENDOFORM- LEAVE ON THE WOUND    2.  Cut Endoform to the size of the wound. It is okay if it overlap the edges a small amount.  Then, moisten the Endoform with a drop or two saline.    3. Apply Endoform to wound (over the old Endoform) then cover with hydrocolloid     *Endoform is naturally used by the body over time so you may not find it in the wound when you change your dressing.  If you do find some, gently cleanse the wound with saline. Do not remove the remaining Endoform*      If for some reason you are not able to get your dressing(s) changed as outlined above (due to illness, lack of supplies, lack of help) please do the following: remove old, soiled dressings; wash the wounds with saline; pat dry; apply ABD pad or other absorbant pad and secure with rolled gauze; avoid tape directly on your skin; Call the clinic as soon as possible to let us know what the current issues are in receiving wound care [phone].      SEEK MEDICAL CARE IF:  You have an increase in swelling, pain, or redness around the wound.  You have an increase in the amount of pus coming from the wound.  There is a bad smell coming from the wound.  The wound appears to be worsening/enlarging  You have a fever greater than 101.5 F      It is ok to continue current wound care treatment/products for the next 2-3 days until new wound care supplies are ordered and arrive. If longer than this please contact our office at [phone].    If you have a 2 layer or 4 layer compression wrap on these are safe to have on for ONLY 7 days. If for some reason you are not able to get the wrap(s) changed (due to illness; lack of supplies, lack of help, lack of transportation) please do the following: unwrap the old 2 or 4 layer compression wrap; avoid using scissors as you  could cut your skin and cause wounds; use tubular compression when available. Call to reschedule your home care or clinic visit appointment as soon as possible.    Please NOTE: if you are 15 minutes late to your clinic appointment you will have to be rescheduled. Please call our clinic as soon as possible if you know you will not be able to get to your appointment at [phone].    If you fail to show up to 3 scheduled clinic appointments you will be dismissed from our clinic.      We want to hear from you!  In the next few weeks, you should receive a call or email to complete a survey about your visit at [facility]. Please help us improve your appointment experience by letting us know how we did today. We strive to make your experience good and value any ways in which we could do better.      We value your input and suggestions.    Thank you for choosing the [facility]!

## 2024-05-20 ENCOUNTER — OFFICE VISIT (OUTPATIENT)
Dept: VASCULAR SURGERY | Facility: CLINIC | Age: 89
End: 2024-05-20
Attending: FAMILY MEDICINE
Payer: MEDICARE

## 2024-05-20 VITALS — OXYGEN SATURATION: 95 % | HEART RATE: 59 BPM | DIASTOLIC BLOOD PRESSURE: 71 MMHG | SYSTOLIC BLOOD PRESSURE: 130 MMHG

## 2024-05-20 DIAGNOSIS — S01.301D OPEN WOUND OF RIGHT EAR, UNSPECIFIED OPEN WOUND TYPE, SUBSEQUENT ENCOUNTER: Primary | ICD-10-CM

## 2024-05-20 PROCEDURE — 99214 OFFICE O/P EST MOD 30 MIN: CPT | Performed by: FAMILY MEDICINE

## 2024-05-20 PROCEDURE — G0463 HOSPITAL OUTPT CLINIC VISIT: HCPCS | Performed by: FAMILY MEDICINE

## 2024-05-20 ASSESSMENT — PAIN SCALES - GENERAL: PAINLEVEL: NO PAIN (0)

## 2024-05-20 NOTE — LETTER
"[facility]  [address] [place] [address] [address]  [place], [place] [address]  [phone]      Fax [phone]    [facility]           Fax: [phone]            Customer Service: [phone]        Account #: [id number]    Wound Dressing Rx and Order Form  Order Status: refill  Verbal: Mildred  Date: May 20, 2024     Jeffy Olson  Gender: male  : 1931  [address] [place]  [facility] [address]  [phone] (home)     Medical Record: [id number]  Primary Care Provider: Jeff Hardy      ICD-10-CM    1. Open wound of right ear, unspecified open wound type, subsequent encounter  S01.301D Wound care            Insurance Info:  INSURER: Payor: MEDICARE / Plan: MEDICARE / Product Type: Medicare /   Policy ID#:  [id number]  SECONDARY INSURANCE:  [employer]  Secondary Policy ID#:  [id number]        Physician Info:   Name:  CHARLEY CARNEY     Dept Address/Phones:   [address] [place] [address], [address] [address] [phone]  Fax: [phone]    Lymphedema circumferential measurements (in cm):       No data to display                  Wound info:  VASC Wound right ear (Active)   Pre Size Length 1.7 24 1500   Pre Size Width 0.5 24 1500   Pre Size Depth 0.1 24 1500   Pre Total Sq cm 0.85 24 1500   Number of days: 42        Drainage: light  Thickness:  full  Duration of Need: 30 DAYS  Days Supply: 30 DAYS  Start Date: 2024  Starter Kit, Ancillary Kit (saline, gloves, gauze): Yes   Qualifying wound/Debridement: Yes     NO SUBSTITUTIONS. Call [phone].      Dressing Type Brand Size Frequency of change  Quantity   Primary endoform  2\"x2\" 2 TIMES PER WEEK and as needed 2 sheets    duoderm  4\"x4\" 2 TIMES PER WEEK and as needed 2 sheets     NO SUBSTITUTIONS. Call [phone] with questions.       OK to forward to covered supplier.    Electronically Signed Physician:  CHARLEY CARNEY             Date: May 20, 2024    " left

## 2024-05-20 NOTE — PROGRESS NOTES
Wound Clinic Note          Visit date: 05/20/2024       Cheif Complaint:     Jeffy Olson is a 89 year old  male had concerns including Wound Check.  He has a right ear wound.      HISTORY OF PRESENT ILLNESS:    Jeffy Olson reports the ulcer has been present since January 2024.  The wound began when he had to start wearing oxygen tubing.  The wound is due to the oxygen tubing rubbing against the area.     Since his last clinic visit with me he has been having more respiratory problems and his had to increase his dose of prednisone.  He has a CT scan of the chest coming up and another appointment with the pulmonologist.    He is continue to use the silicone oxygen tubing.  His wife has been bandaging the area with a hydrocolloid bandage changed every other day.        The pateint denies fevers or chills.  They report the pain from the wound has been 0/10 and has remained about the same recently.      Today the patient reports maintaining a high protein diet, but has not been taking protein supplements lately.        The patient denies a history of diabetes, smoking or chronic steroid use.         The patient has not had any symptoms of infection relating to the wound recently and is not currently on antibiotics.       Problem List:   No past medical history on file.           Family Hx: family history is not on file.       Surgical Hx:   Past Surgical History:   Procedure Laterality Date    CATARACT EXTRACTION Bilateral     CERVICAL FUSION      HAND SURGERY Bilateral     HEMORRHOIDECTOMY EXTERNAL      HERNIA REPAIR      x2    IR LUMBAR PUNCTURE  12/29/2015    RELEASE CARPAL TUNNEL Right     REPLACEMENT TOTAL KNEE Bilateral           Allergies:  No Known Allergies           Medication History:    Current Outpatient Medications   Medication Sig Dispense Refill    amLODIPine (NORVASC) 5 MG tablet Take 5 mg by mouth daily      atenolol (TENORMIN) 25 MG tablet Take 50 mg by mouth daily      atovaquone  (MEPRON) 750 MG/5ML suspension       benzonatate (TESSALON) 200 MG capsule TAKE 1 CAPSULE(200 MG) BY MOUTH THREE TIMES DAILY FOR COUGH      cyanocobalamin 1,000 mcg/mL injection [CYANOCOBALAMIN 1,000 MCG/ML INJECTION] Inject 1,000 mcg into the shoulder, thigh, or buttocks every 30 (thirty) days.      ELIQUIS ANTICOAGULANT 5 MG tablet       Lactobacillus-Inulin (CULTURELLE DIGESTIVE DAILY) CAPS Take 1 capsule by mouth      loperamide (IMODIUM A-D) 2 mg tablet Take 4 mg by mouth daily as needed      Multiple Vitamins-Minerals (PRESERVISION AREDS 2) CAPS Take 1 capsule by mouth 2 times daily      predniSONE (DELTASONE) 1 MG tablet Take 60 mg by mouth daily      riTUXimab (RITUXAN IV)       simvastatin (ZOCOR) 20 MG tablet Take 20 mg by mouth At Bedtime      sulfamethoxazole-trimethoprim (BACTRIM) 400-80 MG tablet Take 1 tablet by mouth 2 times daily      aspirin (ASA) 81 MG EC tablet Take 1 tablet (81 mg) by mouth daily (Patient not taking: Reported on 4/29/2024)      clopidogrel (PLAVIX) 75 MG tablet Take 1 tablet (75 mg) by mouth daily (Patient not taking: Reported on 4/8/2024) 30 tablet 0    etanercept (ENBREL) 50 mg/mL (0.98 mL) injection [ETANERCEPT (ENBREL) 50 MG/ML (0.98 ML) INJECTION] Inject 50 mg under the skin once a week. (Patient not taking: Reported on 4/8/2024)      furosemide (LASIX) 20 MG tablet Take 1 tablet (20 mg) by mouth daily (Patient not taking: Reported on 4/8/2024) 30 tablet 0    methylcellulose (CITRUCEL) 500 mg Tab [METHYLCELLULOSE (CITRUCEL) 500 MG TAB] Take 1,000 mg by mouth daily. (Patient not taking: Reported on 4/8/2024)      Simethicone 125 MG CAPS Take 1 capsule by mouth 2 times daily as needed (Patient not taking: Reported on 5/20/2024)       No current facility-administered medications for this visit.         Tobacco History:  reports that he quit smoking about 63 years ago. He does not have any smokeless tobacco history on file.       REVIEW OF SYMPTOMS:   The review of systems was  negative except as noted in the HPI.           PHYSICAL EXAMINATION:     /71   Pulse 59   SpO2 95%            GENERAL: The patient overall appears well and is no acute distress.   HEAD: normocephalic   EYES: Sclera and conjunctiva clear   NECK: no obvious masses   LUNGS: breathing is unlabored.   EXTREMITIES: No clubbing, cyanosis or edema   SKIN: No rashes or other abnormalities except as noted under the Wound section below.   NEUROLOGICAL: normal motor and sensory function       WOUND/ulcer: The wound appears healthy with no sign of infection.   Wound bed: granulation tissue  Periwound: healthy intact skin  Fairly small wound behind the right ear.  The wound is still overall about the same today compared with his last clinic visit.      Also see below for wound details:     Circumferential volume measures:             No data to display                Ulceration(s)/Wound(s):   Please see the media tab under the chart review for pictures of the wounds.  Nursing staff removed dressings and cleansed wound.    VASC Wound right ear (Active)   Pre Size Length 1.7 05/20/24 1500   Pre Size Width 0.5 05/20/24 1500   Pre Size Depth 0.1 05/20/24 1500   Pre Total Sq cm 0.85 05/20/24 1500               Recent Labs   Lab Test 08/04/22  1739   A1C 5.6          Recent Labs   Lab Test 05/08/23  1617 09/28/22  1108 07/25/22  1344   ALBUMIN 3.6 3.6 3.6              No sharp debridement performed today.                  ASSESSMENT:   This is a 89 year old  male with a right ear wound due to oxygen tubing.          PLAN:   We'll bandage the area with endoform and a hydrocolloid bandage changed 2-3 times a week by his wife.    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  If is not any progress by his next clinic visit we may have to have him see the dermatologist again about possibly biopsying the area to evaluate for skin cancer.  I have not biopsied this area before myself and I  prefer to have the dermatologist perform the biopsy if they are comfortable.    I have encouraged the patient to continue on their high protein diet to aid in wound healing.   The patient will return to the wound clinic in 3 to 4 weeks to see me again.        30 minutes spent on the date of the encounter doing chart review, history and exam, documentation and further activities per the note, this time excludes any procedure time      Augustine Blanton MD  05/20/2024   3:21 PM   [facility]  [phone]    This note was electronically signed by Augustine Blanton MD

## 2024-06-10 NOTE — PATIENT INSTRUCTIONS
Wound care supplies were not ordered or needed today.    Please call your dermatologist to get a biopsy done of the ear    Wound Care Instructions    2 TIMES PER WEEK and as needed, Cleanse your right ear wound(s) with Normal saline.    Pat Dry with non-sterile gauze    Primary Dressing: Apply hydrocolloid/duoderm into/onto the wounds    It is ok to get your wound wet in the bath or shower      If for some reason you are not able to get your dressing(s) changed as outlined above (due to illness, lack of supplies, lack of help) please do the following: remove old, soiled dressings; wash the wounds with saline; pat dry; apply ABD pad or other absorbant pad and secure with rolled gauze; avoid tape directly on your skin; Call the clinic as soon as possible to let us know what the current issues are in receiving wound care [phone].      SEEK MEDICAL CARE IF:  You have an increase in swelling, pain, or redness around the wound.  You have an increase in the amount of pus coming from the wound.  There is a bad smell coming from the wound.  The wound appears to be worsening/enlarging  You have a fever greater than 101.5 F      It is ok to continue current wound care treatment/products for the next 2-3 days until new wound care supplies are ordered and arrive. If longer than this please contact our office at [phone].    If you have a 2 layer or 4 layer compression wrap on these are safe to have on for ONLY 7 days. If for some reason you are not able to get the wrap(s) changed (due to illness; lack of supplies, lack of help, lack of transportation) please do the following: unwrap the old 2 or 4 layer compression wrap; avoid using scissors as you could cut your skin and cause wounds; use tubular compression when available. Call to reschedule your home care or clinic visit appointment as soon as possible.    Please NOTE: if you are 15 minutes late to your clinic appointment you will have to be rescheduled. Please call  our clinic as soon as possible if you know you will not be able to get to your appointment at [phone].    If you fail to show up to 3 scheduled clinic appointments you will be dismissed from our clinic.              We want to hear from you!  In the next few weeks, you should receive a call or email to complete a survey about your visit at [facility]. Please help us improve your appointment experience by letting us know how we did today. We strive to make your experience good and value any ways in which we could do better.      We value your input and suggestions.    Thank you for choosing the [facility]!

## 2024-06-17 ENCOUNTER — OFFICE VISIT (OUTPATIENT)
Dept: VASCULAR SURGERY | Facility: CLINIC | Age: 89
End: 2024-06-17
Attending: FAMILY MEDICINE
Payer: MEDICARE

## 2024-06-17 VITALS — OXYGEN SATURATION: 93 % | SYSTOLIC BLOOD PRESSURE: 156 MMHG | DIASTOLIC BLOOD PRESSURE: 68 MMHG | HEART RATE: 64 BPM

## 2024-06-17 DIAGNOSIS — S01.301D OPEN WOUND OF RIGHT EAR, UNSPECIFIED OPEN WOUND TYPE, SUBSEQUENT ENCOUNTER: Primary | ICD-10-CM

## 2024-06-17 PROCEDURE — G0463 HOSPITAL OUTPT CLINIC VISIT: HCPCS | Performed by: FAMILY MEDICINE

## 2024-06-17 PROCEDURE — 99214 OFFICE O/P EST MOD 30 MIN: CPT | Performed by: FAMILY MEDICINE

## 2024-06-17 ASSESSMENT — PAIN SCALES - GENERAL: PAINLEVEL: NO PAIN (0)

## 2024-06-17 NOTE — PROGRESS NOTES
Wound Clinic Note          Visit date: 06/17/2024       Cheif Complaint:     Jeffy Olson is a 89 year old  male had concerns including Wound Check.  He has a right ear wound.      HISTORY OF PRESENT ILLNESS:    Jeffy Olson reports the ulcer has been present since January 2024.  The wound began when he had to start wearing oxygen tubing.  The wound is due to the oxygen tubing rubbing against the area.     He is continue to use the silicone oxygen tubing.  His wife has been bandaging the area with endoform and a hydrocolloid bandage changed every 2 to 3 days.  He reports with using the endoform the hydrocolloid is not sticking as well and sometimes there is bleeding that comes out from underneath a hydrocolloid that gets on his pillowcase.        The pateint denies fevers or chills.  They report the pain from the wound has been 0/10 and has remained about the same recently.      Today the patient reports maintaining a high protein diet, but has not been taking protein supplements lately.        The patient denies a history of diabetes, smoking or chronic steroid use.         The patient has not had any symptoms of infection relating to the wound recently and is not currently on antibiotics.       Problem List:   No past medical history on file.           Family Hx: family history is not on file.       Surgical Hx:   Past Surgical History:   Procedure Laterality Date    CATARACT EXTRACTION Bilateral     CERVICAL FUSION      HAND SURGERY Bilateral     HEMORRHOIDECTOMY EXTERNAL      HERNIA REPAIR      x2    IR LUMBAR PUNCTURE  12/29/2015    RELEASE CARPAL TUNNEL Right     REPLACEMENT TOTAL KNEE Bilateral           Allergies:  No Known Allergies           Medication History:    Current Outpatient Medications   Medication Sig Dispense Refill    amLODIPine (NORVASC) 5 MG tablet Take 5 mg by mouth daily      aspirin (ASA) 81 MG EC tablet Take 1 tablet (81 mg) by mouth daily      atenolol (TENORMIN) 25 MG tablet  Take 50 mg by mouth daily      atovaquone (MEPRON) 750 MG/5ML suspension       benzonatate (TESSALON) 200 MG capsule TAKE 1 CAPSULE(200 MG) BY MOUTH THREE TIMES DAILY FOR COUGH      clopidogrel (PLAVIX) 75 MG tablet Take 1 tablet (75 mg) by mouth daily 30 tablet 0    cyanocobalamin 1,000 mcg/mL injection [CYANOCOBALAMIN 1,000 MCG/ML INJECTION] Inject 1,000 mcg into the shoulder, thigh, or buttocks every 30 (thirty) days.      ELIQUIS ANTICOAGULANT 5 MG tablet       etanercept (ENBREL) 50 mg/mL (0.98 mL) injection Inject 50 mg Subcutaneous once a week      furosemide (LASIX) 20 MG tablet Take 1 tablet (20 mg) by mouth daily 30 tablet 0    Lactobacillus-Inulin (CULTURELLE DIGESTIVE DAILY) CAPS Take 1 capsule by mouth      loperamide (IMODIUM A-D) 2 mg tablet Take 4 mg by mouth daily as needed      methylcellulose (CITRUCEL) 500 mg Tab Take 1,000 mg by mouth daily      Multiple Vitamins-Minerals (PRESERVISION AREDS 2) CAPS Take 1 capsule by mouth 2 times daily      predniSONE (DELTASONE) 1 MG tablet Take 60 mg by mouth daily      riTUXimab (RITUXAN IV)       Simethicone 125 MG CAPS Take 1 capsule by mouth 2 times daily as needed      simvastatin (ZOCOR) 20 MG tablet Take 20 mg by mouth At Bedtime      sulfamethoxazole-trimethoprim (BACTRIM) 400-80 MG tablet Take 1 tablet by mouth 2 times daily       No current facility-administered medications for this visit.         Tobacco History:  reports that he quit smoking about 63 years ago. He does not have any smokeless tobacco history on file.       REVIEW OF SYMPTOMS:   The review of systems was negative except as noted in the HPI.           PHYSICAL EXAMINATION:     BP (!) 156/68   Pulse 64   SpO2 93%            GENERAL: The patient overall appears well and is no acute distress.   HEAD: normocephalic   EYES: Sclera and conjunctiva clear   NECK: no obvious masses   LUNGS: breathing is unlabored.   EXTREMITIES: No clubbing, cyanosis or edema   SKIN: No rashes or other  abnormalities except as noted under the Wound section below.   NEUROLOGICAL: normal motor and sensory function       WOUND/ulcer: The wound appears healthy with no sign of infection.   Wound bed: granulation tissue  Periwound: healthy intact skin  Fairly small wound behind the right ear.  The wound is still overall about the same today compared with his last clinic visit.  There is nothing on physical exam that looks like a skin cancer.      Also see below for wound details:     Circumferential volume measures:             No data to display                Ulceration(s)/Wound(s):   Please see the media tab under the chart review for pictures of the wounds.  Nursing staff removed dressings and cleansed wound.    VASC Wound right ear (Active)   Pre Size Length 1.7 06/17/24 1400   Pre Size Width 0.5 06/17/24 1400   Pre Size Depth 0.1 06/17/24 1400   Pre Total Sq cm 0.85 06/17/24 1400                 Recent Labs   Lab Test 08/04/22  1739   A1C 5.6          Recent Labs   Lab Test 05/08/23  1617 09/28/22  1108 07/25/22  1344   ALBUMIN 3.6 3.6 3.6              No sharp debridement performed today.                  ASSESSMENT:   This is a 89 year old  male with a right ear wound due to oxygen tubing.          PLAN:   We'll bandage the area with a hydrocolloid bandage changed 2-3 times a week by his wife.  We have done everything we can as far as protecting the area by getting the silicone oxygen tubing, using the ear protectors for the oxygen tubing and using a hydrocolloid bandage to protect the area.  Despite all these interventions the wound is not making any progress towards healing.  Also trying the collagen bandage has not been helpful.  I am concerned that there could be a skin cancer here since he has a history of skin cancer in the same ear.  I advised him that there is no sign on physical exam of a skin cancer but it still very possible that there could be a skin cancer here without showing any signs on physical  exam yet.  I recommend that he contact his dermatologist to have the area biopsied as soon as possible.  Will discontinue the endoform for now since it did not seem to help and it caused the hydrocolloid not to stick as well causing bleeding to leak out from underneath the bandage.  I discussed with the wound care nurses here any other bandage we could try however they all feel that any bandages going to be too bulky for this area and cause a hydrocolloid bandage to leak.  We will try going back to just the hydrocolloid bandage since the sticks in place better with nothing under it.      I have encouraged the patient to continue on their high protein diet to aid in wound healing.   The patient will return to the wound clinic in 3 to 4 weeks to see me again.        30 minutes spent on the date of the encounter doing chart review, history and exam, documentation and further activities per the note, this time excludes any procedure time      Augustine Blanton MD  06/17/2024   3:08 PM   [facility]  [phone]    This note was electronically signed by Augustine Blanton MD

## 2024-06-24 ENCOUNTER — TELEPHONE (OUTPATIENT)
Dept: VASCULAR SURGERY | Facility: CLINIC | Age: 89
End: 2024-06-24
Payer: MEDICARE

## 2024-06-24 NOTE — TELEPHONE ENCOUNTER
Caller: Ang    Provider: MD Augustine Blanton    Detailed reason for call: Patient calling as an FYI to let Dr. Dacosta's team know that he did go to dermatology and it is cancer.  He is going to do radiation.  He wanted to thank Dr. Blanton's team.  His future appointment is canceled but he would like to come back if it's needed again later.

## 2024-08-10 ENCOUNTER — HEALTH MAINTENANCE LETTER (OUTPATIENT)
Age: 89
End: 2024-08-10

## 2024-09-04 ENCOUNTER — TRANSFERRED RECORDS (OUTPATIENT)
Dept: HEALTH INFORMATION MANAGEMENT | Facility: CLINIC | Age: 89
End: 2024-09-04
Payer: MEDICARE

## 2025-08-16 ENCOUNTER — HEALTH MAINTENANCE LETTER (OUTPATIENT)
Age: OVER 89
End: 2025-08-16